# Patient Record
Sex: MALE | Race: WHITE | NOT HISPANIC OR LATINO | Employment: FULL TIME | URBAN - METROPOLITAN AREA
[De-identification: names, ages, dates, MRNs, and addresses within clinical notes are randomized per-mention and may not be internally consistent; named-entity substitution may affect disease eponyms.]

---

## 2022-08-23 ENCOUNTER — OFFICE VISIT (OUTPATIENT)
Dept: URGENT CARE | Facility: CLINIC | Age: 32
End: 2022-08-23
Payer: OTHER MISCELLANEOUS

## 2022-08-23 VITALS
HEART RATE: 87 BPM | HEIGHT: 73 IN | SYSTOLIC BLOOD PRESSURE: 144 MMHG | OXYGEN SATURATION: 97 % | TEMPERATURE: 98 F | RESPIRATION RATE: 18 BRPM | WEIGHT: 283 LBS | DIASTOLIC BLOOD PRESSURE: 89 MMHG | BODY MASS INDEX: 37.51 KG/M2

## 2022-08-23 DIAGNOSIS — M54.30 SCIATIC LEG PAIN: Primary | ICD-10-CM

## 2022-08-23 DIAGNOSIS — Z02.6 ENCOUNTER RELATED TO WORKER'S COMPENSATION CLAIM: ICD-10-CM

## 2022-08-23 PROCEDURE — 96372 THER/PROPH/DIAG INJ SC/IM: CPT | Mod: S$GLB,,, | Performed by: PHYSICIAN ASSISTANT

## 2022-08-23 PROCEDURE — 99203 OFFICE O/P NEW LOW 30 MIN: CPT | Mod: 25,S$GLB,, | Performed by: PHYSICIAN ASSISTANT

## 2022-08-23 PROCEDURE — 96372 PR INJECTION,THERAP/PROPH/DIAG2ST, IM OR SUBCUT: ICD-10-PCS | Mod: S$GLB,,, | Performed by: PHYSICIAN ASSISTANT

## 2022-08-23 PROCEDURE — 99203 PR OFFICE/OUTPT VISIT, NEW, LEVL III, 30-44 MIN: ICD-10-PCS | Mod: 25,S$GLB,, | Performed by: PHYSICIAN ASSISTANT

## 2022-08-23 RX ORDER — PREDNISONE 20 MG/1
40 TABLET ORAL DAILY
Qty: 10 TABLET | Refills: 0 | Status: SHIPPED | OUTPATIENT
Start: 2022-08-23 | End: 2022-08-28

## 2022-08-23 RX ORDER — BETAMETHASONE SODIUM PHOSPHATE AND BETAMETHASONE ACETATE 3; 3 MG/ML; MG/ML
9 INJECTION, SUSPENSION INTRA-ARTICULAR; INTRALESIONAL; INTRAMUSCULAR; SOFT TISSUE
Status: COMPLETED | OUTPATIENT
Start: 2022-08-23 | End: 2022-08-23

## 2022-08-23 RX ADMIN — BETAMETHASONE SODIUM PHOSPHATE AND BETAMETHASONE ACETATE 9 MG: 3; 3 INJECTION, SUSPENSION INTRA-ARTICULAR; INTRALESIONAL; INTRAMUSCULAR; SOFT TISSUE at 03:08

## 2022-08-23 NOTE — PROGRESS NOTES
"Subjective:       Patient ID: Car Rivera is a 31 y.o. male.    Vitals:  height is 6' 1" (1.854 m) and weight is 128.4 kg (283 lb). His temperature is 98.2 °F (36.8 °C). His blood pressure is 144/89 (abnormal) and his pulse is 87. His respiration is 18 and oxygen saturation is 97%.     Chief Complaint: Back Pain    This is a 31 y.o. male who presents today with a chief complaint of left low back pain that started on 7/24. Pt states he was at work lifting heavy boxes on 7/24 when his foot twisted injuring his back. He was seen in [place] at an  and states he was given muscle relaxers, naproxen, and steroids. He report no relief and states symptoms have persisted. Pt states the pain is made worse with movement. He states the pain radiates down the back of his right leg. Denies saddle paresthesia, extremity weakness, or bowel/urinary incontinence.     Back Pain  This is a new problem. The current episode started 1 to 4 weeks ago. The problem occurs constantly. The problem has been gradually worsening since onset. The pain is present in the lumbar spine and gluteal. The quality of the pain is described as aching, stabbing, shooting, cramping and burning. The pain radiates to the left thigh. The pain is at a severity of 6/10. The pain is moderate. The pain is worse during the night. The symptoms are aggravated by bending and position (walking ). Stiffness is present all day. Associated symptoms include tingling. Pertinent negatives include no abdominal pain, bladder incontinence, bowel incontinence, chest pain, dysuria, fever, headaches, numbness or pelvic pain. Risk factors include recent trauma and obesity. He has tried NSAIDs, muscle relaxant and home exercises for the symptoms. The treatment provided mild relief.       Constitution: Negative for chills, sweating and fever.   HENT: Negative for ear pain, congestion and sore throat.    Neck: Negative for neck pain, neck stiffness and painful lymph nodes. "   Cardiovascular: Negative for chest pain, palpitations and sob on exertion.   Eyes: Negative for eye discharge, eye itching and eye pain.   Respiratory: Negative for cough, sputum production and shortness of breath.    Gastrointestinal: Negative for abdominal pain, nausea, vomiting, diarrhea and bowel incontinence.   Genitourinary: Negative for dysuria, bladder incontinence, hematuria and pelvic pain.   Musculoskeletal: Positive for pain, back pain, pain with walking, muscle cramps and muscle ache.   Skin: Negative for pale, rash and wound.   Neurological: Positive for tingling. Negative for dizziness, light-headedness, coordination disturbances, headaches and numbness.   Hematologic/Lymphatic: Negative for swollen lymph nodes.       Objective:      Physical Exam   Constitutional: He is oriented to person, place, and time. He appears well-developed. He is cooperative. He does not appear ill. No distress.   HENT:   Head: Normocephalic and atraumatic.   Ears:   Right Ear: External ear normal.   Left Ear: External ear normal.   Nose: Nose normal.   Mouth/Throat: Oropharynx is clear and moist.   Eyes: Conjunctivae, EOM and lids are normal.   Neck: Trachea normal and phonation normal. Neck supple. No decreased range of motion present. No pain with movement present. No spinous process tenderness present. No muscular tenderness present.   Cardiovascular: Regular rhythm and normal heart sounds.   No murmur heard.Exam reveals no gallop.   Pulmonary/Chest: Effort normal and breath sounds normal. No respiratory distress. He has no decreased breath sounds. He has no wheezes. He has no rhonchi. He has no rales.   Musculoskeletal: Normal range of motion.         General: Normal range of motion.      Cervical back: He exhibits no tenderness, no bony tenderness, no swelling and no spasm.      Thoracic back: Normal. He exhibits normal range of motion, no tenderness, no bony tenderness, no swelling, no deformity and no spasm.       Lumbar back: Normal. He exhibits normal range of motion, no tenderness, no bony tenderness, no swelling, no edema, no deformity and no spasm.      Comments: Full ROM and 5/5 strength of BLE   Neurological: He is alert and oriented to person, place, and time. He has normal motor skills and normal sensation. Gait and coordination normal.   Skin: Skin is warm, dry, intact and not diaphoretic.   Psychiatric: His speech is normal and behavior is normal. Judgment and thought content normal.   Nursing note and vitals reviewed.        Assessment:       1. Sciatic leg pain    2. Encounter related to worker's compensation claim          Plan:         Will treat as sciatic nerve pain with steroids. Pt to follow up with occupational health. Contact information given to schedule appointment. No x-ray available at this location today. X-ray images may be obtained when patient follows up with occupational health.     Sciatic leg pain  -     betamethasone acetate-betamethasone sodium phosphate injection 9 mg  -     Ambulatory referral/consult to Occupational Medicine  -     predniSONE (DELTASONE) 20 MG tablet; Take 2 tablets (40 mg total) by mouth once daily. for 5 days  Dispense: 10 tablet; Refill: 0    Encounter related to worker's compensation claim  -     betamethasone acetate-betamethasone sodium phosphate injection 9 mg  -     Ambulatory referral/consult to Occupational Medicine  -     predniSONE (DELTASONE) 20 MG tablet; Take 2 tablets (40 mg total) by mouth once daily. for 5 days  Dispense: 10 tablet; Refill: 0      Patient Instructions   -Take oral steroid as prescribed.  -Call [email or web address] to schedule follow up appointment with occupational health, as discussed.    Please follow up with your primary care provider within 2-5 days if your signs and symptoms have not resolved or worsen.     If your condition worsens or fails to improve we recommend that you receive another evaluation at the emergency room immediately or  contact your primary medical clinic to discuss your concerns.   You must understand that you have received an Urgent Care treatment only and that you may be released before all of your medical problems are known or treated. You, the patient, will arrange for follow up care as instructed.

## 2022-08-23 NOTE — PATIENT INSTRUCTIONS
-Take oral steroid as prescribed.  -Call [email or web address] to schedule follow up appointment with occupational health, as discussed.    Please follow up with your primary care provider within 2-5 days if your signs and symptoms have not resolved or worsen.     If your condition worsens or fails to improve we recommend that you receive another evaluation at the emergency room immediately or contact your primary medical clinic to discuss your concerns.   You must understand that you have received an Urgent Care treatment only and that you may be released before all of your medical problems are known or treated. You, the patient, will arrange for follow up care as instructed.

## 2022-08-24 ENCOUNTER — OFFICE VISIT (OUTPATIENT)
Dept: URGENT CARE | Facility: CLINIC | Age: 32
End: 2022-08-24
Payer: OTHER MISCELLANEOUS

## 2022-08-24 VITALS
TEMPERATURE: 99 F | SYSTOLIC BLOOD PRESSURE: 142 MMHG | HEIGHT: 73 IN | RESPIRATION RATE: 20 BRPM | BODY MASS INDEX: 37.51 KG/M2 | WEIGHT: 283 LBS | HEART RATE: 101 BPM | DIASTOLIC BLOOD PRESSURE: 96 MMHG | OXYGEN SATURATION: 97 %
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
## 2022-08-24 DIAGNOSIS — Z02.6 ENCOUNTER RELATED TO WORKER'S COMPENSATION CLAIM: ICD-10-CM

## 2022-08-24 DIAGNOSIS — M54.42 ACUTE MIDLINE LOW BACK PAIN WITH LEFT-SIDED SCIATICA: Primary | ICD-10-CM

## 2022-08-24 DIAGNOSIS — M54.9 DORSALGIA, UNSPECIFIED: ICD-10-CM

## 2022-08-24 PROCEDURE — 72100 XR LUMBAR SPINE 2 OR 3 VIEWS: ICD-10-PCS | Mod: S$GLB,,, | Performed by: RADIOLOGY

## 2022-08-24 PROCEDURE — 72100 X-RAY EXAM L-S SPINE 2/3 VWS: CPT | Mod: S$GLB,,, | Performed by: RADIOLOGY

## 2022-08-24 PROCEDURE — 99203 PR OFFICE/OUTPT VISIT, NEW, LEVL III, 30-44 MIN: ICD-10-PCS | Mod: S$GLB,,, | Performed by: PHYSICIAN ASSISTANT

## 2022-08-24 PROCEDURE — 99203 OFFICE O/P NEW LOW 30 MIN: CPT | Mod: S$GLB,,, | Performed by: PHYSICIAN ASSISTANT

## 2022-08-24 RX ORDER — NAPROXEN 500 MG/1
500 TABLET ORAL 2 TIMES DAILY WITH MEALS
Qty: 30 TABLET | Refills: 0 | Status: SHIPPED | OUTPATIENT
Start: 2022-08-24 | End: 2022-10-24 | Stop reason: SDUPTHER

## 2022-08-24 RX ORDER — METHOCARBAMOL 500 MG/1
1000 TABLET, FILM COATED ORAL 3 TIMES DAILY PRN
Qty: 60 TABLET | Refills: 0 | Status: SHIPPED | OUTPATIENT
Start: 2022-08-24 | End: 2022-09-08

## 2022-08-24 NOTE — LETTER
[facility] - Occupational Health  [address] [address]  Phone: [phone]  Fax: [phone]  [facility]: [email or web address]    Pt Name: Car Rivera  Injury Date: 07/24/2022   Employee ID: [id number] Date of First Treatment: 08/24/2022   Company: [employer]      Appointment Time:  Arrived: 01:51 PM   Provider: Zurdo Gomez PA-C Time Out:  04:45 PM     Office Treatment:   1. Acute midline low back pain with left-sided sciatica    2. Encounter related to worker's compensation claim    3. Dorsalgia, unspecified    4. Work related injury      Medications Ordered This Encounter   Medications    methocarbamoL (ROBAXIN) 500 MG Tab    naproxen (NAPROSYN) 500 MG tablet      Patient Instructions: Daily home exercises/warm soaks, PT to be scheduled once authorized      Restrictions: Avoid frequent bending/lifting/twisting, Avoid climbing/kneeling/squatting, No lifting/pushing/pulling more than 10 lbs, Sit or stand as needed, No Prolonged standing/walking     Return Appointment:     9/7/2022 at 10:30 AM     SH

## 2022-08-24 NOTE — PROGRESS NOTES
Subjective:       Patient ID: Car Rivera is a 31 y.o. male.    Chief Complaint: Back Pain (LOW)     New Workers comp 7/24/2022. Patient went to see his own MD. He saw an MD that never Xrayed him. Pain 7/10. He states that he has pain that goes into his left calk and he can not clean his [place] [facility]    31-year-old male presents with low back pain radiating to the left lower extremity for 1 month.  Patient states he was at work when he was twisting to the left while holding a box and felt immediate pain to the low back.  Patient reports he had to sit down after a while.  Says he then noticed pain radiating to the left calf intermittently.  He reports pain is worse with prolonged walking.  Says he went to urgent care while in [place] where the injury occurred and was prescribed muscle relaxers and NSAIDs with some relief.  No x-rays were done at the time.  He was then seen at urgent care yesterday and prescribed prednisone.  He has not started prednisone yet.  Again no x-rays were performed.  He was diagnosed with sciatica.  He denies bowel or bladder incontinence or saddle anesthesia.  He reports previous low back injury when he pulled a muscle while in the .  He reports his previous low back injury resolved prior to current injury. [facility]    Back Pain  This is a new problem. The current episode started more than 1 month ago. The problem occurs intermittently. The problem has been gradually worsening since onset. The pain is present in the lumbar spine. The pain radiates to the left thigh. The pain is at a severity of 7/10. The pain is moderate. The pain is worse during the night. The symptoms are aggravated by standing and position. Stiffness is present all day. Associated symptoms include numbness. Pertinent negatives include no bladder incontinence, bowel incontinence, chest pain or fever. He has tried ice, analgesics and muscle relaxant for the symptoms. The treatment provided mild relief.        Constitution: Negative for chills, fatigue and fever.   HENT: Negative for facial trauma.    Neck: Negative for neck pain and neck stiffness.   Cardiovascular: Negative for chest trauma and chest pain.   Eyes: Negative for eye trauma and eye pain.   Respiratory: Negative for shortness of breath.    Gastrointestinal: Negative for bowel incontinence.   Genitourinary: Negative for bladder incontinence.   Musculoskeletal: Positive for pain, abnormal ROM of joint, back pain, pain with walking, muscle cramps and muscle ache. Negative for joint pain, joint swelling and history of spine disorder.   Skin: Negative for color change, wound, abrasion, laceration, erythema and bruising.   Neurological: Positive for numbness and tingling. Negative for coordination disturbances.        Objective:      Physical Exam  Vitals and nursing note reviewed.   Constitutional:       General: He is not in acute distress.     Appearance: Normal appearance. He is well-developed.   HENT:      Head: Normocephalic and atraumatic.      Right Ear: Hearing and external ear normal.      Left Ear: Hearing and external ear normal.      Nose: Nose normal. No nasal deformity.   Eyes:      General: Lids are normal.      Conjunctiva/sclera: Conjunctivae normal.      Right eye: Right conjunctiva is not injected.      Left eye: Left conjunctiva is not injected.   Neck:      Trachea: Trachea normal.   Cardiovascular:      Pulses: Normal pulses.           Dorsalis pedis pulses are 2+ on the right side and 2+ on the left side.        Posterior tibial pulses are 2+ on the right side and 2+ on the left side.   Pulmonary:      Effort: Pulmonary effort is normal. No respiratory distress.      Breath sounds: No stridor.   Musculoskeletal:      Cervical back: Normal and normal range of motion. No spinous process tenderness or muscular tenderness.      Thoracic back: Normal.      Lumbar back: Tenderness present. No deformity. Decreased range of motion. Positive  left straight leg raise test. Negative right straight leg raise test.   Skin:     General: Skin is warm and dry.      Findings: No abrasion, bruising or erythema.   Neurological:      Mental Status: He is alert.      GCS: GCS eye subscore is 4. GCS verbal subscore is 5. GCS motor subscore is 6.      Sensory: No sensory deficit.      Motor: Motor function is intact. No weakness (BLE strength 5/5).      Deep Tendon Reflexes: Reflexes are normal and symmetric.      Reflex Scores:       Patellar reflexes are 2+ on the right side and 2+ on the left side.       Achilles reflexes are 2+ on the right side and 2+ on the left side.  Psychiatric:         Attention and Perception: He is attentive.         Speech: Speech normal.         Behavior: Behavior normal.         Thought Content: Thought content normal.           X-Ray Lumbar Spine 2 Or 3 Views    Result Date: 8/24/2022  EXAMINATION: XR LUMBAR SPINE 2 OR 3 VIEWS CLINICAL HISTORY: Low back pain, no red flags, no prior management;WORKERS COMP;  Dorsalgia, unspecified TECHNIQUE: AP and lateral radiographs of the lumbar spine were obtained with a spot lateral radiograph of the lumbosacral junction. COMPARISON: None FINDINGS: There is straightening of the normal lumbar lordosis.  Otherwise, posterior vertebral alignment is satisfactory.  Vertebral body heights are well maintained.  There is no evidence for acute fracture or bone destruction.  There is disc space narrowing present at the L4-5 and L5-S1 levels.  There are small marginal osteophytes within the lower lumbar spine and lumbosacral junction.  No abnormal paraspinal masses are identified.  Sacroiliac joints are unremarkable.     No evidence for acute fracture, bone destruction, or subluxation. Degenerative changes within the lower lumbar spine and lumbosacral junction. Electronically signed by: Fabricio Ma MD Date:    08/24/2022 Time:    16:25    Assessment:       1. Acute midline low back pain with left-sided  sciatica    2. Encounter related to worker's compensation claim    3. Dorsalgia, unspecified    4. Work related injury        Plan:         Patient works shift work in [employer].  He works 2 weeks on and 1 week off.  There may be a conflict with work and being able to get physical therapy.  I instructed patient to discuss with his supervisor to see if there is a solution.  He may contact me with any questions.      Medications Ordered This Encounter   Medications    methocarbamoL (ROBAXIN) 500 MG Tab     Sig: Take 2 tablets (1,000 mg total) by mouth 3 (three) times daily as needed (muscle spasms).     Dispense:  60 tablet     Refill:  0    naproxen (NAPROSYN) 500 MG tablet     Sig: Take 1 tablet (500 mg total) by mouth 2 (two) times daily with meals.     Dispense:  30 tablet     Refill:  0     Patient Instructions: Daily home exercises/warm soaks, PT to be scheduled once authorized   Restrictions: Avoid frequent bending/lifting/twisting, Avoid climbing/kneeling/squatting, No lifting/pushing/pulling more than 10 lbs, Sit or stand as needed, No Prolonged standing/walking  Follow up in about 2 weeks (around 9/7/2022).

## 2022-08-25 ENCOUNTER — TELEPHONE (OUTPATIENT)
Dept: URGENT CARE | Facility: CLINIC | Age: 32
End: 2022-08-25
Payer: OTHER MISCELLANEOUS

## 2022-08-25 DIAGNOSIS — M54.9 DORSALGIA, UNSPECIFIED: Primary | ICD-10-CM

## 2022-08-25 RX ORDER — PREDNISONE 20 MG/1
40 TABLET ORAL DAILY
Qty: 10 TABLET | Refills: 0 | Status: SHIPPED | OUTPATIENT
Start: 2022-08-25 | End: 2022-08-30

## 2022-09-12 ENCOUNTER — OFFICE VISIT (OUTPATIENT)
Dept: URGENT CARE | Facility: CLINIC | Age: 32
End: 2022-09-12
Payer: OTHER MISCELLANEOUS

## 2022-09-12 VITALS
BODY MASS INDEX: 37.37 KG/M2 | HEIGHT: 73 IN | SYSTOLIC BLOOD PRESSURE: 139 MMHG | OXYGEN SATURATION: 98 % | DIASTOLIC BLOOD PRESSURE: 92 MMHG | TEMPERATURE: 98 F | HEART RATE: 89 BPM | WEIGHT: 282 LBS | RESPIRATION RATE: 20 BRPM

## 2022-09-12 DIAGNOSIS — M54.42 ACUTE MIDLINE LOW BACK PAIN WITH LEFT-SIDED SCIATICA: Primary | ICD-10-CM

## 2022-09-12 DIAGNOSIS — M54.16 LUMBAR RADICULOPATHY: ICD-10-CM

## 2022-09-12 PROCEDURE — 99214 OFFICE O/P EST MOD 30 MIN: CPT | Mod: S$GLB,,, | Performed by: EMERGENCY MEDICINE

## 2022-09-12 PROCEDURE — 99214 PR OFFICE/OUTPT VISIT, EST, LEVL IV, 30-39 MIN: ICD-10-PCS | Mod: S$GLB,,, | Performed by: EMERGENCY MEDICINE

## 2022-09-12 RX ORDER — TIZANIDINE 4 MG/1
4 TABLET ORAL EVERY 8 HOURS
Qty: 30 TABLET | Refills: 0 | Status: SHIPPED | OUTPATIENT
Start: 2022-09-12 | End: 2022-09-22

## 2022-09-12 NOTE — LETTER
[facility] - Occupational Health  [address] [address]  Phone: [phone]  Fax: [phone]  [facility]: [email or web address]    Pt Name: Car Rivera  Injury Date: 07/24/2022   Employee ID: [id number] Date of Treatment: 09/12/2022   Company: [id number]      Appointment Time: 8:30am Arrived: 8:36am   Provider: Robles Castillo MD Time Out: 9:45am     Office Treatment:   1. Acute midline low back pain with left-sided sciatica    2. Lumbar radiculopathy      Medications Ordered This Encounter   Medications    tiZANidine (ZANAFLEX) 4 MG tablet      Patient Instructions: Attention not to aggravate affected area, Continue Physical Therapy, MRI to be scheduled once authorized, Apply ice 24-48 hours then apply heat/warm soaks, Daily home exercises/warm soaks      Restrictions: Sit or stand as needed, Avoid frequent bending/lifting/twisting, No lifting/pushing/pulling more than 10 lbs, Avoid climbing/kneeling/squatting, No Prolonged standing/walking     Return Appointment: 10/3/2022 at 9:00am.

## 2022-09-12 NOTE — PROGRESS NOTES
Subjective:       Patient ID: Car Rivera is a 32 y.o. male.    Chief Complaint: Back Pain (low)    [employer] Workers comp 7/24/2022. Patient is here to follow up on a low back injury. Patient states that he is feeling the same. Patient is going to PT. Patient is out of Naproxen. Pain 5/10. [facility]    Patient works 2 weeks on and 2 weeks off at [employer] and has been working light duty.  Reports persistent pain to the left low back with spasm as well as a shooting pain down his left lower extremity and calf as well as as occasional paresthesias to the left lower extremity.  No bowel or bladder incontinence.  He states the worst thing about it is the spasm of the back.  He also reports no significant improvement with physical therapy and voices frustration with physical therapy in [place].  He has been taking naproxen and Robaxin and also reports no significant improvement with.  Will change from Robaxin to Zanaflex.  Will also order MRI for persistent pain without relief with conservative measures of rest, physical therapy, anti-inflammatories already tried to rule out herniated disc or other acute abnormality with nerve root compression.  He will return when he comes back from work after October 2nd.    Back Pain  This is a new problem. The current episode started more than 1 month ago. The problem occurs intermittently. The problem has been gradually worsening since onset. The pain is present in the lumbar spine. The pain radiates to the left thigh. The pain is at a severity of 7/10. The pain is moderate. The pain is Worse during the night. The symptoms are aggravated by standing and position. Stiffness is present All day. Associated symptoms include numbness. Pertinent negatives include no bladder incontinence, bowel incontinence, chest pain or fever. He has tried ice, analgesics and muscle relaxant for the symptoms. The treatment provided mild relief.     ROS    Constitution: Negative for chills, fatigue  and fever.   HENT:  Negative for facial trauma.    Neck: Negative for neck pain and neck stiffness.   Cardiovascular:  Negative for chest trauma and chest pain.   Eyes:  Negative for eye trauma and eye pain.   Respiratory:  Negative for shortness of breath.    Gastrointestinal:  Negative for bowel incontinence.   Genitourinary:  Negative for bladder incontinence.   Musculoskeletal:  Positive for pain, abnormal ROM of joint, back pain, pain with walking, muscle cramps and muscle ache. Negative for joint pain, joint swelling and history of spine disorder.   Skin:  Negative for color change, wound, abrasion, laceration, erythema and bruising.   Neurological:  Positive for numbness and tingling. Negative for coordination disturbances.      Objective:      Physical Exam  Vitals and nursing note reviewed.   Constitutional:       General: He is not in acute distress.     Appearance: Normal appearance. He is well-developed.   HENT:      Head: Normocephalic and atraumatic.      Right Ear: Hearing and external ear normal.      Left Ear: Hearing and external ear normal.      Nose: Nose normal. No nasal deformity.   Eyes:      General: Lids are normal.      Conjunctiva/sclera: Conjunctivae normal.      Right eye: Right conjunctiva is not injected.      Left eye: Left conjunctiva is not injected.   Neck:      Trachea: Trachea normal.   Cardiovascular:      Pulses: Normal pulses.           Dorsalis pedis pulses are 2+ on the right side and 2+ on the left side.        Posterior tibial pulses are 2+ on the right side and 2+ on the left side.   Pulmonary:      Effort: Pulmonary effort is normal. No respiratory distress.      Breath sounds: No stridor.   Musculoskeletal:         General: Tenderness and signs of injury present. No swelling.      Cervical back: Normal and normal range of motion. No spinous process tenderness or muscular tenderness.      Thoracic back: Normal.      Lumbar back: Tenderness present. No deformity.  Decreased range of motion. Positive left straight leg raise test. Negative right straight leg raise test.   Skin:     General: Skin is warm and dry.      Findings: No abrasion, bruising or erythema.   Neurological:      Mental Status: He is alert.      GCS: GCS eye subscore is 4. GCS verbal subscore is 5. GCS motor subscore is 6.      Sensory: No sensory deficit.      Motor: Motor function is intact. No weakness (BLE strength 5/5).      Deep Tendon Reflexes: Reflexes are normal and symmetric.      Reflex Scores:       Patellar reflexes are 2+ on the right side and 2+ on the left side.       Achilles reflexes are 2+ on the right side and 2+ on the left side.  Psychiatric:         Attention and Perception: He is attentive.         Speech: Speech normal.         Behavior: Behavior normal.         Thought Content: Thought content normal.       Assessment:       1. Acute midline low back pain with left-sided sciatica    2. Lumbar radiculopathy          Plan:       Patient works 2 weeks on and 2 weeks off at [employer] and has been working light duty.  Reports persistent pain to the left low back with spasm as well as a shooting pain down his left lower extremity and calf as well as as occasional paresthesias to the left lower extremity.  No bowel or bladder incontinence.  He states the worst thing about it is the spasm of the back.  He also reports no significant improvement with physical therapy and voices frustration with physical therapy in [place].  He has been taking naproxen and Robaxin and also reports no significant improvement with.  Will change from Robaxin to Zanaflex.  Will also order MRI for persistent pain without relief with conservative measures of rest, physical therapy, anti-inflammatories already tried to rule out herniated disc or other acute abnormality with nerve root compression.  He will return when he comes back from work after October 2nd.  Medications Ordered This Encounter   Medications     tiZANidine (ZANAFLEX) 4 MG tablet     Sig: Take 1 tablet (4 mg total) by mouth every 8 (eight) hours. for 10 days     Dispense:  30 tablet     Refill:  0     Patient Instructions: Attention not to aggravate affected area, Continue Physical Therapy, MRI to be scheduled once authorized, Apply ice 24-48 hours then apply heat/warm soaks, Daily home exercises/warm soaks   Restrictions: Sit or stand as needed, Avoid frequent bending/lifting/twisting, No lifting/pushing/pulling more than 10 lbs, Avoid climbing/kneeling/squatting, No Prolonged standing/walking  Follow up in about 3 weeks (around 10/3/2022).

## 2022-09-30 ENCOUNTER — HOSPITAL ENCOUNTER (OUTPATIENT)
Dept: RADIOLOGY | Facility: OTHER | Age: 32
Discharge: HOME | End: 2022-09-30
Attending: EMERGENCY MEDICINE
Payer: OTHER MISCELLANEOUS

## 2022-09-30 DIAGNOSIS — M54.16 LUMBAR RADICULOPATHY: ICD-10-CM

## 2022-09-30 PROCEDURE — 72148 MRI LUMBAR SPINE W/O DYE: CPT | Mod: TC

## 2022-09-30 PROCEDURE — 72148 MRI LUMBAR SPINE WITHOUT CONTRAST: ICD-10-PCS | Mod: 26,,, | Performed by: RADIOLOGY

## 2022-09-30 PROCEDURE — 72148 MRI LUMBAR SPINE W/O DYE: CPT | Mod: 26,,, | Performed by: RADIOLOGY

## 2022-10-03 ENCOUNTER — OFFICE VISIT (OUTPATIENT)
Dept: URGENT CARE | Facility: CLINIC | Age: 32
End: 2022-10-03
Payer: OTHER MISCELLANEOUS

## 2022-10-03 VITALS
DIASTOLIC BLOOD PRESSURE: 93 MMHG | HEART RATE: 84 BPM | BODY MASS INDEX: 37.11 KG/M2 | SYSTOLIC BLOOD PRESSURE: 153 MMHG | RESPIRATION RATE: 18 BRPM | TEMPERATURE: 97 F | HEIGHT: 73 IN | WEIGHT: 280 LBS | OXYGEN SATURATION: 100 %

## 2022-10-03 DIAGNOSIS — M54.42 ACUTE MIDLINE LOW BACK PAIN WITH LEFT-SIDED SCIATICA: Primary | ICD-10-CM

## 2022-10-03 DIAGNOSIS — M51.26 PROTRUSION OF LUMBAR INTERVERTEBRAL DISC: ICD-10-CM

## 2022-10-03 DIAGNOSIS — M54.16 LUMBAR RADICULOPATHY: ICD-10-CM

## 2022-10-03 PROCEDURE — 99214 OFFICE O/P EST MOD 30 MIN: CPT | Mod: S$GLB,,, | Performed by: NURSE PRACTITIONER

## 2022-10-03 PROCEDURE — 99214 PR OFFICE/OUTPT VISIT, EST, LEVL IV, 30-39 MIN: ICD-10-PCS | Mod: S$GLB,,, | Performed by: NURSE PRACTITIONER

## 2022-10-03 RX ORDER — ETODOLAC 400 MG/1
400 TABLET, FILM COATED ORAL 2 TIMES DAILY
Qty: 30 TABLET | Refills: 0 | Status: SHIPPED | OUTPATIENT
Start: 2022-10-03 | End: 2022-10-24 | Stop reason: DRUGHIGH

## 2022-10-03 NOTE — LETTER
[facility] - [facility]  [address] [address]  Phone: [phone]  Fax: [phone]  [facility]: [email or web address]     Name: Car Rivera  Injury Date: 07/24/2022   Employee ID: [id number] Date of Treatment: 10/03/2022   Company: [employer]      Appointment Time: 09:00 AM Arrived: 8:55 AM    Provider: Ludy Ruiz NP Time Out: 10:10 AM      Office Treatment:   1. Acute midline low back pain with left-sided sciatica    2. Protrusion of lumbar intervertebral disc    3. Lumbar radiculopathy      Medications Ordered This Encounter   Medications    etodolac (LODINE) 400 MG tablet      Patient Instructions: Attention not to aggravate affected area, Daily home exercises/warm soaks, Continue Physical Therapy, Referral to specialist to be scheduled, once authorized      Restrictions: Avoid frequent bending/lifting/twisting, Avoid climbing/kneeling/squatting, No lifting/pushing/pulling more than 10 lbs, Sit or stand as needed, No Prolonged standing/walking, Discharged to Ortho/Neuro/Opthomologist/Surgeon     Return Appointment: 10/24/2022 at 9:00 AM NI

## 2022-10-03 NOTE — PROGRESS NOTES
Subjective:       Patient ID: Car Rivera is a 32 y.o. male.    Chief Complaint: Back Pain (lower)    [employer] comp 7/24/2022. Patient is here to follow up on a low back injury. Patient states that he is feeling the same. Patient is going to PT. Patient is out of Naproxen. Pain 5/10. He states he need a mri     He had MRI on Friday 9/30/22.  He is home this week. Returns to work in [place] on the 8th. 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 Says PT has not been helpful. Pain to L lower back with radiating, shooting pain to thigh and foot. Naprosyn and Robaxin have not helped. MWT    Back Pain  This is a new problem. The current episode started more than 1 month ago. The problem occurs intermittently. The problem has been gradually worsening since onset. The pain is present in the lumbar spine. The pain radiates to the left thigh. The pain is at a severity of 7/10. The pain is moderate. The pain is Worse during the night. The symptoms are aggravated by standing and position. Stiffness is present All day. Associated symptoms include numbness. Pertinent negatives include no abdominal pain, bladder incontinence, bowel incontinence, chest pain or fever. He has tried ice, analgesics and muscle relaxant for the symptoms. The treatment provided mild relief.     Constitution: Negative for chills, fatigue and fever.   HENT:  Negative for facial trauma.    Neck: Negative for neck pain and neck stiffness.   Cardiovascular:  Negative for chest trauma and chest pain.   Eyes:  Negative for eye trauma and eye pain.   Respiratory:  Negative for shortness of breath.    Gastrointestinal:  Negative for abdominal pain, nausea, vomiting and bowel incontinence.   Genitourinary:  Negative for bladder incontinence.   Musculoskeletal:  Positive for pain, abnormal ROM of joint, back pain, pain with walking, muscle cramps and muscle ache. Negative for joint pain, joint swelling and history of spine disorder.   Skin:  Negative for color change, wound, abrasion,  laceration, erythema and bruising.   Neurological:  Positive for numbness and tingling. Negative for coordination disturbances.      Objective:      Physical Exam  Constitutional:       Appearance: Normal appearance.   Cardiovascular:      Pulses: Normal pulses.   Pulmonary:      Effort: Pulmonary effort is normal.   Musculoskeletal:         General: Tenderness present. No swelling.      Lumbar back: Tenderness present. No swelling or deformity. Decreased range of motion. Positive left straight leg raise test. Negative right straight leg raise test.        Back:       Comments: Pain to L lower back with radiation to L thigh and to foot. Positive L SLR. Pain with flexion 60 degrees. Pain also with extension and bilateral bending.    Skin:     General: Skin is warm and dry.      Findings: No bruising or erythema.   Neurological:      General: No focal deficit present.      Mental Status: He is alert and oriented to person, place, and time.      Deep Tendon Reflexes:      Reflex Scores:       Patellar reflexes are 2+ on the right side and 2+ on the left side.       Achilles reflexes are 2+ on the right side and 2+ on the left side.  Psychiatric:         Mood and Affect: Mood normal.         Behavior: Behavior normal.         Thought Content: Thought content normal.         Judgment: Judgment normal.       Assessment:       1. Acute midline low back pain with left-sided sciatica    2. Protrusion of lumbar intervertebral disc    3. Lumbar radiculopathy        Plan:     MRI Lumbar Spine Without Contrast    Result Date: 9/30/2022  EXAMINATION: MRI LUMBAR SPINE WITHOUT CONTRAST CLINICAL HISTORY: Lumbar radiculopathy, trauma; Radiculopathy, lumbar region TECHNIQUE: Multiplanar, multisequence MR images were acquired from the thoracolumbar junction to the sacrum without the administration of contrast. COMPARISON: None FINDINGS: The marrow demonstrates homogeneous signal.  Vertebral body heights are maintained.  Disc space  narrowing and disc desiccation L4-5 and L5-S1. slight grade 1 retrolisthesis L4-5 and L5-S1.  Conus terminates appropriately at L1. Multilevel degenerative change as diesel below: L1-2: Mild facet arthropathy without significant canal or neural foraminal narrowing. L2-3: Mild facet arthropathy without significant canal or neural foraminal narrowing. L3-4: Facet arthropathy. Posterior epidural lipomatosis with mild canal narrowing. L4-5: Central disc protrusion measures approximately 7 mm in AP dimension.  Mild facet arthropathy.  Combination of degenerative change and prominent posterior epidural fat result in moderate-severe canal narrowing and mild left neural foraminal narrowing. L5-S1: Posterior circumferential disc bulge with superimposed left paracentral disc protrusion measuring 13 mm in AP dimension.  Disc contacts and results in mass effect upon the descending left S1 nerve root.  Prominent anterior epidural lipomatosis resulting in diffuse narrowing of the thecal sac.  Moderate left neural foraminal narrowing.     At L4-5, central disc protrusion.  Moderate canal and mild left neural foraminal narrowing. At L5-S1, large left paracentral disc protrusion contacting and resulting in posterior displacement of the descending left S1 nerve root.  Diffuse narrowing of the thecal sac related to degenerative change and prominent anterior epidural lipomatosis.  Moderate left neural foraminal narrowing. Electronically signed by: Anju Alfaor Date:    09/30/2022 Time:    14:12    I have reviewed the MRI of the L spine with patient which shows L5S1 large L paracentral disc protrusion contacting and resulting in posterior displacement of the descending L S1 nerve root.   A referral has been made to neurosurgery for follow up. He will return to work in [place] on 10/8 and will return home on 10/24. If he gets scheduled with specialist he does not have to keep the appt at this clinic on the 24th.    Medications Ordered This  Encounter   Medications    etodolac (LODINE) 400 MG tablet     Sig: Take 1 tablet (400 mg total) by mouth 2 (two) times daily. Take with meals.     Dispense:  30 tablet     Refill:  0   May take OTC Tylenol (Acetominophen)  in addition to the prescribed anti-inflammatory medicine (NSAID).  Do not take other NSAIDS (such as Ibuprofen,Advil, Aleve, Motrin) in addition to the prescribed NSAID.   Patient Instructions: Attention not to aggravate affected area, Daily home exercises/warm soaks, Continue Physical Therapy, Referral to specialist to be scheduled, once authorized   Restrictions: Avoid frequent bending/lifting/twisting, Avoid climbing/kneeling/squatting, No lifting/pushing/pulling more than 10 lbs, Sit or stand as needed, No Prolonged standing/walking, Discharged to Ortho/Neuro/Opthomologist/Surgeon  Follow up in about 3 weeks (around 10/24/2022).

## 2022-10-24 ENCOUNTER — OFFICE VISIT (OUTPATIENT)
Dept: NEUROSURGERY | Facility: CLINIC | Age: 32
End: 2022-10-24
Payer: OTHER MISCELLANEOUS

## 2022-10-24 VITALS
HEART RATE: 116 BPM | HEIGHT: 73 IN | BODY MASS INDEX: 37.11 KG/M2 | DIASTOLIC BLOOD PRESSURE: 89 MMHG | SYSTOLIC BLOOD PRESSURE: 135 MMHG | TEMPERATURE: 98 F | WEIGHT: 280 LBS

## 2022-10-24 DIAGNOSIS — M54.16 LUMBAR RADICULOPATHY: Primary | ICD-10-CM

## 2022-10-24 DIAGNOSIS — M54.42 ACUTE MIDLINE LOW BACK PAIN WITH LEFT-SIDED SCIATICA: ICD-10-CM

## 2022-10-24 PROCEDURE — 99204 PR OFFICE/OUTPT VISIT, NEW, LEVL IV, 45-59 MIN: ICD-10-PCS | Mod: S$GLB,,, | Performed by: NURSE PRACTITIONER

## 2022-10-24 PROCEDURE — 99999 PR PBB SHADOW E&M-EST. PATIENT-LVL IV: CPT | Mod: PBBFAC,,, | Performed by: NURSE PRACTITIONER

## 2022-10-24 PROCEDURE — 99204 OFFICE O/P NEW MOD 45 MIN: CPT | Mod: S$GLB,,, | Performed by: NURSE PRACTITIONER

## 2022-10-24 PROCEDURE — 99999 PR PBB SHADOW E&M-EST. PATIENT-LVL IV: ICD-10-PCS | Mod: PBBFAC,,, | Performed by: NURSE PRACTITIONER

## 2022-10-24 RX ORDER — LIDOCAINE 50 MG/G
1 PATCH TOPICAL DAILY PRN
Qty: 30 PATCH | Refills: 0 | Status: SHIPPED | OUTPATIENT
Start: 2022-10-24 | End: 2022-11-14 | Stop reason: SDUPTHER

## 2022-10-24 RX ORDER — NAPROXEN 500 MG/1
500 TABLET ORAL 2 TIMES DAILY WITH MEALS
Qty: 30 TABLET | Refills: 0 | Status: SHIPPED | OUTPATIENT
Start: 2022-10-24 | End: 2022-11-14 | Stop reason: SDUPTHER

## 2022-10-24 RX ORDER — TIZANIDINE 4 MG/1
4 TABLET ORAL EVERY 6 HOURS PRN
Qty: 30 TABLET | Refills: 0 | Status: SHIPPED | OUTPATIENT
Start: 2022-10-24 | End: 2022-11-14 | Stop reason: SDUPTHER

## 2022-10-24 NOTE — PROGRESS NOTES
Neurosurgery  History & Physical    SUBJECTIVE:     Chief Complaint: Lumbar Radiculopathy    History of Present Illness: Car Rivera is a 32 y.o. male being seen in clinic today to discuss concerns with lumbar radiculopathy. 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 He has also been taking naproxen and Zanaflex with mild relief. States that his symptoms have improved slightly since the initial injury. Rates his pain today as a 7/10. Describes the pain as constant and aching on the left-side of the low back with radiation down the posterior and lateral aspect of the left leg associated with numbness and tingling. Aggravating factors include standing and walking. Alleviating factors include frequent position changes and medications. Denies weakness, b/b dysfunction, saddle anesthesia, or gait instability. He is scheduled to return to work next week.       Review of patient's allergies indicates:  No Known Allergies    Current Outpatient Medications   Medication Sig Dispense Refill    LIDOcaine (LIDODERM) 5 % Place 1 patch onto the skin daily as needed (pain). Remove & Discard patch within 12 hours or as directed by MD 30 patch 0    naproxen (NAPROSYN) 500 MG tablet Take 1 tablet (500 mg total) by mouth 2 (two) times daily with meals. 30 tablet 0    tiZANidine (ZANAFLEX) 4 MG tablet Take 1 tablet (4 mg total) by mouth every 6 (six) hours as needed (muscle spasms). 30 tablet 0     No current facility-administered medications for this visit.       History reviewed. No pertinent past medical history.  History reviewed. No pertinent surgical history.  Family History    None       Social History     Socioeconomic History    Marital status:    Tobacco Use    Smoking status: Never    Smokeless tobacco: Never       Review of Systems   Constitutional:  Negative  "for activity change, appetite change and fever.   HENT:  Negative for hearing loss and postnasal drip.    Eyes:  Negative for pain and visual disturbance.   Respiratory:  Negative for shortness of breath.    Cardiovascular:  Negative for chest pain and palpitations.   Gastrointestinal:  Negative for abdominal pain.   Endocrine: Negative for polydipsia, polyphagia and polyuria.   Musculoskeletal:  Positive for back pain and myalgias. Negative for gait problem.   Neurological:  Positive for numbness. Negative for dizziness, weakness and headaches.   Psychiatric/Behavioral:  Negative for confusion and dysphoric mood.      OBJECTIVE:     Vital Signs  Temp: 97.7 °F (36.5 °C)  Pulse: (!) 116  BP: 135/89  Pain Score:   7  Height: 6' 1" (185.4 cm)  Weight: 127 kg (279 lb 15.8 oz)  Body mass index is 36.94 kg/m².      Neurosurgery Physical Exam  General: well developed, well nourished, no distress.   Head: normocephalic, atraumatic  Neurologic: Alert and oriented. Thought content appropriate.  GCS: Motor: 6/Verbal: 5/Eyes: 4 GCS Total: 15  Mental Status: Awake, Alert, Oriented x 4  Language: No aphasia  Speech: No dysarthria  Cranial nerves: face symmetric, tongue midline, CN II-XII grossly intact.   Eyes: pupils equal, round, reactive to light with accomodation, EOMI.   Pulmonary: normal respirations, no signs of respiratory distress  Abdomen: soft, non-distended  Skin: Skin is warm, dry and intact.  Sensory: intact to light touch throughout  Motor Strength:Moves all extremities spontaneously with good tone.    Strength  Deltoids Triceps Biceps Wrist Extension Wrist Flexion Hand    Upper: R 5/5 5/5 5/5 5/5 5/5 5/5    L 5/5 5/5 5/5 5/5 5/5 5/5     HF KE KF DF PF EHL   Lower: R 5/5 5/5 5/5 5/5 5/5 5/5    L 5/5 5/5 5/5 5/5 5/5 5/5        Cerebellar:   Gait stable, antalgic     Cervical:   ROM: Full with flexion, extension, lateral rotation and ear-to-shoulder bend.   Midline TTP: Negative.     Thoracic:  Midline TTP: " Negative.     Lumbar:  Midline TTP: Positive  Straight Leg Test: Positive LEFT    Other:  SI joint TTP: Negative.  Greater trochanter TTP: Negative.  Tenderness with external/internal hip rotation: Negative.    Diagnostic Results:  I have personally reviewed the MRI lumbar spine dated 9/30/22. The imaging shows L4-5, central disc protrusion with moderate canal and mild left neural foraminal narrowing. At L5-S1, large left paracentral disc protrusion contacting and resulting in posterior displacement of the descending left S1 nerve root.     ASSESSMENT/PLAN:   Car Rivera is a 32 y.o. male seen in clinic today to discuss concerns with lumbar radiculopathy. 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 I would like the patient to obtain PT with [facility] to see if additional pain relief can be obtained. Injections with pain management have also been recommended. The patient is apprehensive about any surgical interventions at this time.     I would like the patient to follow-up in 3 months to discuss progress with conservative therapy. I have encouraged him to contact the clinic with any questions, concerns, or adverse clinical changes. He verbalized understanding.      TEMITOPE Crum  Neurosurgery  [facility]. [place].      Note dictated with voice recognition software, please excuse any grammatical errors.

## 2022-10-25 ENCOUNTER — TELEPHONE (OUTPATIENT)
Dept: PAIN MEDICINE | Facility: OTHER | Age: 32
End: 2022-10-25
Payer: OTHER MISCELLANEOUS

## 2022-10-25 ENCOUNTER — PATIENT MESSAGE (OUTPATIENT)
Dept: PAIN MEDICINE | Facility: OTHER | Age: 32
End: 2022-10-25
Payer: OTHER MISCELLANEOUS

## 2022-10-25 DIAGNOSIS — M54.16 LUMBAR RADICULOPATHY: Primary | ICD-10-CM

## 2022-10-25 NOTE — TELEPHONE ENCOUNTER
Spoke to patient to schedule direct referral procedure. Patient is scheduled on 11/14/22 at 10:00 AM with Dr. Silva. Patient was offered an opportunity to be scheduled in the Pain Management Clinic for a consult with the performing provider before scheduling. Patient declined provider consult. Date, time, and instructions for procedure given to patient. Patient verbalized understanding.

## 2022-11-03 ENCOUNTER — TELEPHONE (OUTPATIENT)
Dept: NEUROSURGERY | Facility: CLINIC | Age: 32
End: 2022-11-03
Payer: OTHER MISCELLANEOUS

## 2022-11-03 NOTE — TELEPHONE ENCOUNTER
Spoke with pt, I notified him that Eleonora reviewed his message and stated that he can get the xray either before or after, it doesn't matter, pt verbalized understanding and stated thank you.

## 2022-11-03 NOTE — TELEPHONE ENCOUNTER
----- Message from Natalia Varner sent at 11/3/2022  3:06 PM CDT -----  Regarding: Appointment  Contact: [phone]  Pt is wanting to know if she do his XR before his procedure on 11/14 to have a needle place or should he go after his procedure when its originally scheduled for. Please call to discuss further @ [phone]

## 2022-11-14 ENCOUNTER — HOSPITAL ENCOUNTER (OUTPATIENT)
Facility: OTHER | Age: 32
Discharge: HOME | End: 2022-11-14
Attending: ANESTHESIOLOGY | Admitting: ANESTHESIOLOGY
Payer: OTHER MISCELLANEOUS

## 2022-11-14 VITALS
DIASTOLIC BLOOD PRESSURE: 84 MMHG | BODY MASS INDEX: 36.45 KG/M2 | TEMPERATURE: 98 F | SYSTOLIC BLOOD PRESSURE: 136 MMHG | HEIGHT: 73 IN | HEART RATE: 103 BPM | OXYGEN SATURATION: 95 % | RESPIRATION RATE: 16 BRPM | WEIGHT: 275 LBS

## 2022-11-14 DIAGNOSIS — G89.29 CHRONIC PAIN: ICD-10-CM

## 2022-11-14 DIAGNOSIS — M51.37 DDD (DEGENERATIVE DISC DISEASE), LUMBOSACRAL: ICD-10-CM

## 2022-11-14 DIAGNOSIS — M54.16 LUMBAR RADICULOPATHY: Primary | ICD-10-CM

## 2022-11-14 PROCEDURE — 62323 NJX INTERLAMINAR LMBR/SAC: CPT | Performed by: ANESTHESIOLOGY

## 2022-11-14 PROCEDURE — 62323 NJX INTERLAMINAR LMBR/SAC: CPT | Mod: ,,, | Performed by: ANESTHESIOLOGY

## 2022-11-14 PROCEDURE — 25000003 PHARM REV CODE 250: Performed by: ANESTHESIOLOGY

## 2022-11-14 PROCEDURE — 25500020 PHARM REV CODE 255: Performed by: ANESTHESIOLOGY

## 2022-11-14 PROCEDURE — 63600175 PHARM REV CODE 636 W HCPCS: Performed by: ANESTHESIOLOGY

## 2022-11-14 PROCEDURE — 62323 PR INJ LUMBAR/SACRAL, W/IMAGING GUIDANCE: ICD-10-PCS | Mod: ,,, | Performed by: ANESTHESIOLOGY

## 2022-11-14 RX ORDER — LIDOCAINE HYDROCHLORIDE 10 MG/ML
INJECTION, SOLUTION EPIDURAL; INFILTRATION; INTRACAUDAL; PERINEURAL
Status: DISCONTINUED | OUTPATIENT
Start: 2022-11-14 | End: 2022-11-14 | Stop reason: HOSPADM

## 2022-11-14 RX ORDER — LIDOCAINE HYDROCHLORIDE 20 MG/ML
INJECTION, SOLUTION INFILTRATION; PERINEURAL
Status: DISCONTINUED | OUTPATIENT
Start: 2022-11-14 | End: 2022-11-14 | Stop reason: HOSPADM

## 2022-11-14 RX ORDER — DEXAMETHASONE SODIUM PHOSPHATE 10 MG/ML
INJECTION INTRAMUSCULAR; INTRAVENOUS
Status: DISCONTINUED | OUTPATIENT
Start: 2022-11-14 | End: 2022-11-14 | Stop reason: HOSPADM

## 2022-11-14 RX ORDER — SODIUM CHLORIDE 9 MG/ML
500 INJECTION, SOLUTION INTRAVENOUS CONTINUOUS
Status: DISCONTINUED | OUTPATIENT
Start: 2022-11-14 | End: 2022-11-14 | Stop reason: HOSPADM

## 2022-11-14 RX ORDER — ALPRAZOLAM 0.5 MG/1
1 TABLET ORAL ONCE
Status: COMPLETED | OUTPATIENT
Start: 2022-11-14 | End: 2022-11-14

## 2022-11-14 RX ADMIN — ALPRAZOLAM 1 MG: 0.5 TABLET ORAL at 10:11

## 2022-11-14 NOTE — OP NOTE
Lumbar Interlaminar Epidural Steroid Injection under Fluoroscopic Guidance    The procedure, risks, benefits, and options were discussed with the patient. There are no contraindications to the procedure. The patent expressed understanding and agreed to the procedure. Informed written consent was obtained prior to the start of the procedure and can be found in the patient's chart.    PATIENT NAME: Car Rivera   MRN: [id number]     DATE OF PROCEDURE: 11/14/2022    PROCEDURE: Lumbar Interlaminar Epidural Steroid Injection L5/S1 under Fluoroscopic Guidance    PRE-OP DIAGNOSIS: Lumbar radiculopathy [M54.16] Lumbar radiculopathy [M54.16]    POST-OP DIAGNOSIS: Same    PHYSICIAN: Dustin Silva MD    ASSISTANTS: None    MEDICATIONS INJECTED: Preservative-free Decadron 10mg with 4cc of Lidocaine 1% MPF and preservative free normal saline    LOCAL ANESTHETIC INJECTED: Xylocaine 2%     SEDATION: None    ESTIMATED BLOOD LOSS: None    COMPLICATIONS: None    TECHNIQUE: Time-out was performed to identify the patient and procedure to be performed. With the patient laying in a prone position, the surgical area was prepped and draped in the usual sterile fashion using ChloraPrep and a fenestrated drape. The level was determined under fluoroscopy guidance. Skin anesthesia was achieved by injecting Lidocaine 2% over the injection site. The interlaminar space was then approached with a 20 gauge,  3.5 inch Tuohy needle that was introduced under fluoroscopic guidance in the AP, lateral and/or contralateral oblique imaging. Once the Ligamentum flavum was encountered loss of resistance to saline was used to enter the epidural space. With positive loss of resistance and negative aspiration for CSF or Blood, contrast dye Omnipaque (240mg/mL) was injected to confirm placement and there was no vascular runoff. 4 mL of the medication mixture listed above was injected slowly. Displacement of the radio opaque contrast after injection of the  medication confirmed that the medication went into the area of the epidural space. The needles were removed and bleeding was nil. A sterile dressing was applied. No specimens collected. The patient tolerated the procedure well.     PAIN BEFORE THE PROCEDURE: 7/10    PAIN AFTER THE PROCEDURE: 0/10   The patient was monitored after the procedure in the recovery area. They were given post-procedure and discharge instructions to follow at home. The patient was discharged in a stable condition.        Dustin Silva MD

## 2022-11-14 NOTE — DISCHARGE INSTRUCTIONS

## 2022-11-14 NOTE — INTERVAL H&P NOTE
The patient was examined and no significant changes were noted from the updated H&P or last clinic note.    The risks and benefits of this procedure, including alternative therapies, were discussed with the patient.  The discussion of risks included infection, bleeding, need for additional procedures or surgery, nerve damage, paralysis, adverse medication reaction(s), stroke, and if appropriate for the procedure, death.  Questions regarding the procedure, risks, expected outcome, and possible side effects were solicited and answered to Car's satisfaction.  Car Rivera wishes to proceed with the injection or procedure as confirmed by written consent.    Plan for L5/S1 interlaminar REINA today.    Active Hospital Problems    Diagnosis  POA    Lumbar radiculopathy [M54.16]  Yes      Resolved Hospital Problems   No resolved problems to display.

## 2022-11-14 NOTE — DISCHARGE SUMMARY
Discharge Note  Short Stay      SUMMARY     Admit Date: 11/14/2022    Attending Physician: Dustin Silva      Discharge Physician: Dutsin Silva      Discharge Date: 11/14/2022 10:46 AM    Procedure(s) (LRB):  LUMBAR L5/S1 REINA CONTRAST DIRECT REFERRAL (N/A)    Final Diagnosis: Lumbar radiculopathy [M54.16]    Disposition: Home or self care    Patient Instructions:   Current Discharge Medication List        CONTINUE these medications which have NOT CHANGED    Details   LIDOcaine (LIDODERM) 5 % Place 1 patch onto the skin daily as needed (pain). Remove & Discard patch within 12 hours or as directed by MD  Qty: 30 patch, Refills: 0    Associated Diagnoses: Lumbar radiculopathy; Acute midline low back pain with left-sided sciatica      naproxen (NAPROSYN) 500 MG tablet Take 1 tablet (500 mg total) by mouth 2 (two) times daily with meals.  Qty: 30 tablet, Refills: 0    Associated Diagnoses: Acute midline low back pain with left-sided sciatica                 Discharge Diagnosis: Lumbar radiculopathy [M54.16]  Condition on Discharge: Stable with no complications to procedure   Diet on Discharge: Same as before.  Activity: as per instruction sheet.  Discharge to: Home with a responsible adult.  Follow up: 2-4 weeks       Please call my office or pager at [phone] if experienced any weakness or loss of sensation, fever > 101.5, pain uncontrolled with oral medications, persistent nausea/vomiting/or diarrhea, redness or drainage from the incisions, or any other worrisome concerns. If physician on call was not reached or could not communicate with our office for any reason please go to the nearest emergency department

## 2022-11-16 ENCOUNTER — CLINICAL SUPPORT (OUTPATIENT)
Dept: REHABILITATION | Facility: HOSPITAL | Age: 32
End: 2022-11-16
Payer: OTHER MISCELLANEOUS

## 2022-11-16 ENCOUNTER — HOSPITAL ENCOUNTER (OUTPATIENT)
Dept: RADIOLOGY | Facility: OTHER | Age: 32
Discharge: HOME | End: 2022-11-16
Attending: NURSE PRACTITIONER
Payer: OTHER MISCELLANEOUS

## 2022-11-16 DIAGNOSIS — M79.605 PAIN IN LEFT LEG: Primary | ICD-10-CM

## 2022-11-16 DIAGNOSIS — M54.16 LUMBAR RADICULOPATHY: ICD-10-CM

## 2022-11-16 DIAGNOSIS — M54.42 ACUTE MIDLINE LOW BACK PAIN WITH LEFT-SIDED SCIATICA: ICD-10-CM

## 2022-11-16 DIAGNOSIS — R29.3 POSTURE ABNORMALITY: ICD-10-CM

## 2022-11-16 PROCEDURE — 97110 THERAPEUTIC EXERCISES: CPT

## 2022-11-16 PROCEDURE — 72120 X-RAY BEND ONLY L-S SPINE: CPT | Mod: TC,FY

## 2022-11-16 PROCEDURE — 97161 PT EVAL LOW COMPLEX 20 MIN: CPT

## 2022-11-16 PROCEDURE — 72120 XR LUMBAR SPINE FLEXION AND EXTENSION ONLY: ICD-10-PCS | Mod: 26,,, | Performed by: RADIOLOGY

## 2022-11-16 PROCEDURE — 72120 X-RAY BEND ONLY L-S SPINE: CPT | Mod: 26,,, | Performed by: RADIOLOGY

## 2022-11-17 ENCOUNTER — CLINICAL SUPPORT (OUTPATIENT)
Dept: REHABILITATION | Facility: HOSPITAL | Age: 32
End: 2022-11-17
Payer: OTHER MISCELLANEOUS

## 2022-11-17 DIAGNOSIS — R29.3 POSTURE ABNORMALITY: ICD-10-CM

## 2022-11-17 DIAGNOSIS — M79.605 PAIN IN LEFT LEG: ICD-10-CM

## 2022-11-17 DIAGNOSIS — M54.16 LUMBAR RADICULOPATHY: Primary | ICD-10-CM

## 2022-11-17 PROCEDURE — 97140 MANUAL THERAPY 1/> REGIONS: CPT

## 2022-11-17 PROCEDURE — 97112 NEUROMUSCULAR REEDUCATION: CPT

## 2022-11-17 PROCEDURE — 97110 THERAPEUTIC EXERCISES: CPT

## 2022-11-17 NOTE — PROGRESS NOTES
Physical Therapy Daily Treatment Note     Name: Car Guo [facility] Number: [id number]    Therapy Diagnosis:   Encounter Diagnoses   Name Primary?    Lumbar radiculopathy Yes    Pain in left leg     Posture abnormality      Physician: Eleonora Hand NP    Visit Date: 11/17/2022  Physician Orders: PT Eval and Treat   Medical Diagnosis from Referral: M54.16 (ICD-10-CM) - Lumbar radiculopathy  Evaluation Date: 11/16/2022  Authorization Period Expiration: 10/24/2023  Plan of Care Expiration: 5/16/2023  Visit # / Visits authorized: 2 / 12     Time In: 13:10  Time Out: 13:50  Total Appointment Time (timed & untimed codes): 40 minutes     Precautions: Standard    Subjective     Pt reports: feeling better. Feels like he can move more. No pain in his foot.  He was compliant with home exercise program.  Response to previous treatment: centralized symptoms  Functional change: improved transitional movement    Pain: 5/10  Location: left LE      Objective   Car received therapeutic exercises to develop strength, endurance, ROM, posture, and core stabilization for 12 minutes including:  Prone press ups with self overpressure x 10  Standing lumbar extension x 10  Education on HEP, PT plan, goals     Car received the following manual therapy techniques: Joint mobilizations and Soft tissue Mobilization were applied to the: lumbar spine for 08 minutes, including:   to thoracic and lumbar spine grade II-IV        Car participated in neuromuscular re-education activities to improve: Coordination, Proprioception, and Posture for 15 minutes. The following activities were included:  Lateral shift correction @ mirror in standing 15 x 5 sec holds  Pallof press in sitting 2x15 each 2 blue bands        Car participated in dynamic functional therapeutic activities to improve functional performance for 0  minutes, including:     Home Exercises and Patient Education Provided     Education  provided:   - HEP, PT plan, goals     Written Home Exercises Provided: yes.  Exercises were reviewed and patient was able to demonstrate them prior to the end of the session.  Patient demonstrated good  understanding of the education provided.      See EMR under Patient Instructions for exercisesprovided 11/16/2022.    Assessment     Improvements seen today - reduced adverse neural tension during SLR and slump testing. Had patient work on correcting lateral shift @ mirror. He experienced peripheralization and increased intensity of symptoms. Had patient then perform JOHNY with overpressure and symptoms centralized and reduced.     Car Is progressing well towards his goals.   Pt prognosis is Good.     Pt will continue to benefit from skilled outpatient physical therapy to address the deficits listed in the problem list box on initial evaluation, provide pt/family education and to maximize pt's level of independence in the home and community environment.     Pt's spiritual, cultural and educational needs considered and pt agreeable to plan of care and goals.    Anticipated barriers to physical therapy: none    Goals:   Short Term Goals:  6 weeks  1.Report no LE pain in foot  increase tolerance for walking and work duties  2. Increase ROM by 25% where limited in order to perform ADLs without difficulty.  3. Demo improved posture evident by eliminated lateral shift 50% of the time.  4. Pt to tolerate HEP to improve ROM and independence with ADL's     Long Term Goals: 12 weeks  1.Report decreased LE pain < / = 4/10  to increase tolerance for work duties.   2.Patient goal: work without pain or limitations.  3.Increase strength to 4+/5 in  left LE myotomes  to increase tolerance for ADL and work activities.  4. Pt will report >/= 60% FOTO  to demonstrate increase in LE function with every day tasks.   5. Demo improved posture evident by eliminated lateral shift 100% of the time.    Plan     Plan of care  Certification: 11/16/2022 to 5/16/2023.     Patient will be gone for work x 2 weeks and will return to PT when he gets back.    Outpatient Physical Therapy 2 times weekly for 6 months to include the following interventions: Manual Therapy, Neuromuscular Re-ed, Patient Education, Therapeutic Activities, and Therapeutic Exercise.    Latisha Zheng, PT , DPT, OCS

## 2022-11-17 NOTE — PLAN OF CARE
[facility]  [place] [id number]  Physical Therapy Initial Evaluation    Date: 11/16/2022   Name: Car Rivera  Clinic Number: [id number]    Therapy Diagnosis:   Encounter Diagnoses   Name Primary?    Lumbar radiculopathy     Pain in left leg Yes    Posture abnormality      Physician: Eleonora Hand, NP    Physician Orders: PT Eval and Treat   Medical Diagnosis from Referral: M54.16 (ICD-10-CM) - Lumbar radiculopathy  Evaluation Date: 11/16/2022  Authorization Period Expiration: 10/24/2023  Plan of Care Expiration: 5/16/2023  Visit # / Visits authorized: 1/ 12    Time In: 12:15  Time Out: 13:00  Total Appointment Time (timed & untimed codes): 45 minutes    Precautions: Standard    Subjective   Date of onset: August 18  History of current condition - Car reports: left sided leg pain in calf and down to foot that started in August while at work. He was lifting something while turned to one side.Since time of injury, he has experienced left sided low back pain with symptoms into left LE. He completed PT in [place] but said they mostly just did dry needling and it didn't help. He recently had an epidural injection which has helped with his low back pain.      Medical History:   No past medical history on file.    Surgical History:   Car Rivera  has a past surgical history that includes Epidural steroid injection (N/A, 11/14/2022).    Medications:   Car has a current medication list which includes the following prescription(s): lidocaine, naproxen, and tizanidine.    Allergies:   Review of patient's allergies indicates:  No Known Allergies     Imaging, MRI studies: At L4-5, central disc protrusion.  Moderate canal and mild left neural foraminal narrowing.     At L5-S1, large left paracentral disc protrusion contacting and resulting in posterior displacement of the descending left S1 nerve root.  Diffuse narrowing of the thecal sac related to degenerative change  and prominent anterior epidural lipomatosis.  Moderate left neural foraminal narrowing.    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     Objective     Observation: Right lateral shift    Lumbar Range of Motion:    Limitations Pain   Flexion 50%   increased        Extension 25%   decreased        Left Side Bending 50% increased     Right Side Bending 25% decreased            Lower Extremity Strength  Right LE  Left LE    Quadriceps: 5/5 Quadriceps: 5/5   Hamstrings: 5/5 Hamstrings: 4/5   Iliospoas: 5/5 Iliospoas: 5/5   Great toe extension: 5/5 Hip IR: 4/5   Ankle dorsiflexion: 5/5 Ankle dorsiflexion: 4/5   Ankle plantarflexion: 5/5 Ankle plantarflexion: 4+/5     Sensation:light touch intact      Special Tests:  -SLR Test: positive on the left for LE symptoms  -Repeated Flexion: increased pain  -Repeated Ext: reduced pain  -Slump Test: positive on the right for left sided pain; positive on the left    Joint Mobility: hypomobile throughout thoracic spine        Limitation/Restriction for FOTO low back Survey    Therapist reviewed FOTO scores for Car Rivera on 11/16/2022.   FOTO documents entered into [employer] - see Media section.    Limitation Score: 40%         TREATMENT   Treatment Time In: 12:30  Treatment  Time Out: 13:00  Total Treatment time (time-based codes) separate from Evaluation: 30 minutes    Car received therapeutic exercises to develop strength, endurance, ROM, posture, and core stabilization for 30 minutes including:  Prone press ups with self overpressure x 10  Supine sciatic nerve glide x 20 (knee component only)  Side glide correction at the wall x 15  Standing lumbar extension x 10  Education on HEP, PT plan, goals    Car received the following manual therapy techniques: Joint mobilizations and Soft tissue Mobilization were applied to the: lumbar spine for 0 minutes, including:      Car participated in neuromuscular re-education activities to improve: Coordination, Proprioception, and Posture for 0 minutes. The following activities were included:      Car participated in dynamic functional therapeutic activities to improve functional performance for 0  minutes, including:    Home Exercises and Patient Education Provided    Education provided:   - HEP, PT plan, goals    Written Home Exercises Provided: yes.  Exercises were reviewed and patient was able to demonstrate them prior to the end of the session.  Patient demonstrated good  understanding of the education provided.     See EMR under Patient Instructions for exercisesprovided 11/16/2022.    Assessment   Car is a 32 y.o. male referred to outpatient Physical Therapy with a medical diagnosis of lumbar radiculopathy. Pt presents with limited lumbar ROM, lateral shift in standing and sitting positions, L4-5 myotomal weakness, adverse neural tension, and limited functional movement.     Pt prognosis is Good.   Pt will benefit from skilled outpatient Physical Therapy to address the deficits stated above and in the chart below, provide pt/family education, and to maximize pt's level of independence.     Plan of care discussed with patient: Yes  Pt's spiritual, cultural and educational needs considered and patient is  agreeable to the plan of care and goals as stated below:     Anticipated Barriers for therapy: none    Medical Necessity is demonstrated by the following  History  Co-morbidities and personal factors that may impact the plan of care Co-morbidities:   none    Personal Factors:   no deficits     low   Examination  Body Structures and Functions, activity limitations and participation restrictions that may impact the plan of care Body Regions:   lower extremities  trunk    Body Systems:    ROM  strength  transitions  motor control    Participation Restrictions:   covid-19    Activity limitations:   Learning and applying knowledge  no deficits    General Tasks and Commands  no deficits    Communication  no deficits    Mobility  lifting and carrying objects  walking  driving (bike, car, motorcycle)    Self care  no deficits    Domestic Life  no deficits    Interactions/Relationships  no deficits    Life Areas  no deficits    Community and Social Life  no deficits         low   Clinical Presentation stable and uncomplicated low   Decision Making/ Complexity Score: low     Goals:  Short Term Goals:  6 weeks  1.Report no LE pain in foot  increase tolerance for walking and work duties  2. Increase ROM by 25% where limited in order to perform ADLs without difficulty.  3. Demo improved posture evident by eliminated lateral shift 50% of the time.  4. Pt to tolerate HEP to improve ROM and independence with ADL's    Long Term Goals: 12 weeks  1.Report decreased LE pain < / = 4/10  to increase tolerance for work duties.   2.Patient goal: work without pain or limitations.  3.Increase strength to 4+/5 in  left LE myotomes  to increase tolerance for ADL and work activities.  4. Pt will report >/= 60% FOTO  to demonstrate increase in LE function with every day tasks.   5. Demo improved posture evident by eliminated lateral shift 100% of the time.    Plan   Plan of care Certification: 11/16/2022 to 5/16/2023.    Outpatient Physical  Therapy 2 times weekly for 6 months to include the following interventions: Manual Therapy, Neuromuscular Re-ed, Patient Education, Therapeutic Activities, and Therapeutic Exercise.     Latisha Zheng, PT, DPT, OCS

## 2023-01-04 ENCOUNTER — TELEPHONE (OUTPATIENT)
Dept: NEUROSURGERY | Facility: CLINIC | Age: 33
End: 2023-01-04
Payer: OTHER MISCELLANEOUS

## 2023-01-04 NOTE — TELEPHONE ENCOUNTER
Pt was contacted and stated that he experiences nausea/ throwing up after going up 3-4 flight of stairs. He questioned if this is related to his back. I informed the pt that it is not related and he may need to speak with his PCP. The pt was also scheduled for his 3 month f/u with Eleonora.  ----- Message from Patricia Gil sent at 1/4/2023 11:47 AM CST -----  Contact: Pt  Pt is callback from provider staff. Pt is having some complications and need adv. Please adv.        Confirmed contact below:   Contact Name:Car Rivera  Phone Number: [phone]

## 2023-01-13 ENCOUNTER — PATIENT MESSAGE (OUTPATIENT)
Dept: REHABILITATION | Facility: HOSPITAL | Age: 33
End: 2023-01-13
Payer: OTHER MISCELLANEOUS

## 2023-01-17 ENCOUNTER — OFFICE VISIT (OUTPATIENT)
Dept: NEUROSURGERY | Facility: CLINIC | Age: 33
End: 2023-01-17
Payer: OTHER MISCELLANEOUS

## 2023-01-17 VITALS
SYSTOLIC BLOOD PRESSURE: 138 MMHG | WEIGHT: 274.94 LBS | HEIGHT: 73 IN | HEART RATE: 120 BPM | BODY MASS INDEX: 36.44 KG/M2 | DIASTOLIC BLOOD PRESSURE: 89 MMHG

## 2023-01-17 DIAGNOSIS — M54.16 LUMBAR RADICULOPATHY: Primary | ICD-10-CM

## 2023-01-17 DIAGNOSIS — M54.42 ACUTE MIDLINE LOW BACK PAIN WITH LEFT-SIDED SCIATICA: ICD-10-CM

## 2023-01-17 PROCEDURE — 99213 PR OFFICE/OUTPT VISIT, EST, LEVL III, 20-29 MIN: ICD-10-PCS | Mod: S$GLB,,, | Performed by: NURSE PRACTITIONER

## 2023-01-17 PROCEDURE — 99999 PR PBB SHADOW E&M-EST. PATIENT-LVL III: ICD-10-PCS | Mod: PBBFAC,,, | Performed by: NURSE PRACTITIONER

## 2023-01-17 PROCEDURE — 99999 PR PBB SHADOW E&M-EST. PATIENT-LVL III: CPT | Mod: PBBFAC,,, | Performed by: NURSE PRACTITIONER

## 2023-01-17 PROCEDURE — 99213 OFFICE O/P EST LOW 20 MIN: CPT | Mod: S$GLB,,, | Performed by: NURSE PRACTITIONER

## 2023-01-17 RX ORDER — IBUPROFEN 200 MG
200 TABLET ORAL EVERY 6 HOURS PRN
COMMUNITY
End: 2023-10-05

## 2023-01-17 NOTE — PROGRESS NOTES
Neurosurgery  Established Patient    SUBJECTIVE:     History of Present Illness: Car Rivera is a 32 y.o. male being seen in clinic today to discuss concerns with lumbar radiculopathy. 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 He has also been taking naproxen and Zanaflex with mild relief. States that his symptoms have improved slightly since the initial injury. Rates his pain today as a 7/10. Describes the pain as constant and aching on the left-side of the low back with radiation down the posterior and lateral aspect of the left leg associated with numbness and tingling. Aggravating factors include standing and walking. Alleviating factors include frequent position changes and medications. Denies weakness, b/b dysfunction, saddle anesthesia, or gait instability. He is scheduled to return to work next week.       Interval Hx 1/17/23: After the last appointment, the patient was apprehensive about surgery so injections were ordered. He obtained the L5/S1 REINA on 11/14/22. States that he obtained about 3 weeks of relief with the injection; however, he continued to work and lately the pain has worsened affecting his ability to work. He would like to discuss surgical options. The pain continues to radiate down the posterior and lateral aspect of the left leg. Denies right leg symptoms. He also obtained 2 additional PT sessions without relief. Denies weakness, b/b dysfunction, saddle anesthesia, or gait instability.    Review of patient's allergies indicates:  No Known Allergies    Current Outpatient Medications   Medication Sig Dispense Refill    ibuprofen (ADVIL,MOTRIN) 200 MG tablet Take 200 mg by mouth every 6 (six) hours as needed for Pain.      naproxen (NAPROSYN) 500 MG tablet Take 1 tablet (500 mg total) by mouth 2 (two) times daily with meals. (Patient  "not taking: Reported on 1/17/2023) 30 tablet 0     No current facility-administered medications for this visit.       History reviewed. No pertinent past medical history.  Past Surgical History:   Procedure Laterality Date    EPIDURAL STEROID INJECTION N/A 11/14/2022    Procedure: LUMBAR L5/S1 REINA CONTRAST DIRECT REFERRAL;  Surgeon: Dustin Silva MD;  Location: [facility];  Service: Pain Management;  Laterality: N/A;     Family History    None       Social History     Socioeconomic History    Marital status:    Tobacco Use    Smoking status: Never    Smokeless tobacco: Never       Review of Systems   Constitutional:  Negative for activity change, appetite change and fever.   HENT:  Negative for hearing loss and postnasal drip.    Eyes:  Negative for pain and visual disturbance.   Respiratory:  Negative for shortness of breath.    Cardiovascular:  Negative for chest pain and palpitations.   Gastrointestinal:  Negative for abdominal pain.   Endocrine: Negative for polydipsia, polyphagia and polyuria.   Musculoskeletal:  Positive for arthralgias, back pain and myalgias. Negative for gait problem.   Neurological:  Positive for numbness. Negative for dizziness, weakness and headaches.   Psychiatric/Behavioral:  Negative for confusion and dysphoric mood.      OBJECTIVE:     Vital Signs  Pulse: (!) 120  BP: 138/89  Pain Score:   7  Height: 6' 1" (185.4 cm)  Weight: 124.7 kg (274 lb 14.6 oz)  Body mass index is 36.27 kg/m².    Neurosurgery Physical Exam  General: well developed, well nourished, no distress.   Head: normocephalic, atraumatic  Neurologic: Alert and oriented. Thought content appropriate.  GCS: Motor: 6/Verbal: 5/Eyes: 4 GCS Total: 15  Mental Status: Awake, Alert, Oriented x 4  Language: No aphasia  Speech: No dysarthria  Cranial nerves: face symmetric, tongue midline, CN II-XII grossly intact.   Eyes: pupils equal, round, reactive to light with accomodation, EOMI.   Pulmonary: normal respirations, no " signs of respiratory distress  Abdomen: soft, non-distended  Skin: Skin is warm, dry and intact.  Sensory: intact to light touch throughout  Motor Strength:Moves all extremities spontaneously with good tone.    Strength   Deltoids Triceps Biceps Wrist Extension Wrist Flexion Hand    Upper: R 5/5 5/5 5/5 5/5 5/5 5/5     L 5/5 5/5 5/5 5/5 5/5 5/5       HF KE KF DF PF EHL   Lower: R 5/5 5/5 5/5 5/5 5/5 5/5     L 5/5 5/5 5/5 5/5 5/5 5/5         Cerebellar:   Gait stable, antalgic     Cervical:   ROM: Full with flexion, extension, lateral rotation and ear-to-shoulder bend.   Midline TTP: Negative.     Thoracic:  Midline TTP: Negative.     Lumbar:  Midline TTP: Positive  Straight Leg Test: Positive LEFT     Other:  SI joint TTP: Negative.  Greater trochanter TTP: Negative.  Tenderness with external/internal hip rotation: Negative.       Diagnostic Results:  There is no new imaging to review for this encounter.      ASSESSMENT/PLAN:   Car Rivera is a 32 y.o. male followed by workman's comp for lumbar radiculopathy due to a large disc protrusion at L5/S1. He has obtained PT and an injection with mild relief; however, the pain has progressed. He is now interested in surgical options as the pain is interfering with his ability to work. He was encouraged to also follow-up with pain management in the interim for additional pain relief options.     I would like the patient to follow-up in clinic with Dr. Whitfield to discuss surgical options. I have encouraged him to contact the clinic with any questions, concerns, or adverse clinical changes. He verbalized understanding.      KEYUR Crum-ROSIE  Neurosurgery  [facility].      Note dictated with voice recognition software, please excuse any grammatical errors.

## 2023-01-19 ENCOUNTER — CLINICAL SUPPORT (OUTPATIENT)
Dept: REHABILITATION | Facility: HOSPITAL | Age: 33
End: 2023-01-19

## 2023-01-19 DIAGNOSIS — M79.605 PAIN IN LEFT LEG: ICD-10-CM

## 2023-01-19 DIAGNOSIS — M54.16 LUMBAR RADICULOPATHY: Primary | ICD-10-CM

## 2023-01-19 DIAGNOSIS — R29.3 POSTURE ABNORMALITY: ICD-10-CM

## 2023-01-19 PROCEDURE — 97140 MANUAL THERAPY 1/> REGIONS: CPT

## 2023-01-19 PROCEDURE — 97112 NEUROMUSCULAR REEDUCATION: CPT

## 2023-01-19 PROCEDURE — 97110 THERAPEUTIC EXERCISES: CPT

## 2023-01-20 ENCOUNTER — TELEPHONE (OUTPATIENT)
Dept: PAIN MEDICINE | Facility: CLINIC | Age: 33
End: 2023-01-20
Payer: OTHER MISCELLANEOUS

## 2023-01-20 NOTE — TELEPHONE ENCOUNTER
----- Message from Orquidea Walsh sent at 1/20/2023  9:39 AM CST -----  Regarding: Sooner Appt Request  Who Is Calling : YANIRA BARRIOS [[id number]]        Reason For The Call: Patient is requesting a sooner appointment.  Patient declined first available and does not want to be added to the waitlist.  Please contact the patient to schedule.        Preferred Contact Method: [phone]        Additional Information: Bulging disc in back and neurologist set patient up to see pain management but appointment was canceled by provider. Patient is needing to be seen as soon as possible.

## 2023-01-23 ENCOUNTER — OFFICE VISIT (OUTPATIENT)
Dept: NEUROSURGERY | Facility: CLINIC | Age: 33
End: 2023-01-23
Payer: OTHER MISCELLANEOUS

## 2023-01-23 ENCOUNTER — TELEPHONE (OUTPATIENT)
Dept: NEUROSURGERY | Facility: CLINIC | Age: 33
End: 2023-01-23

## 2023-01-23 ENCOUNTER — PATIENT MESSAGE (OUTPATIENT)
Dept: NEUROSURGERY | Facility: CLINIC | Age: 33
End: 2023-01-23

## 2023-01-23 VITALS
SYSTOLIC BLOOD PRESSURE: 154 MMHG | BODY MASS INDEX: 36.31 KG/M2 | DIASTOLIC BLOOD PRESSURE: 106 MMHG | HEIGHT: 73 IN | WEIGHT: 274 LBS | HEART RATE: 120 BPM

## 2023-01-23 DIAGNOSIS — M79.605 PAIN IN LEFT LEG: Primary | ICD-10-CM

## 2023-01-23 DIAGNOSIS — M48.061 SPINAL STENOSIS OF LUMBAR REGION WITHOUT NEUROGENIC CLAUDICATION: ICD-10-CM

## 2023-01-23 DIAGNOSIS — M54.16 LUMBAR RADICULOPATHY: ICD-10-CM

## 2023-01-23 PROCEDURE — 99999 PR PBB SHADOW E&M-EST. PATIENT-LVL III: CPT | Mod: PBBFAC,,, | Performed by: NEUROLOGICAL SURGERY

## 2023-01-23 PROCEDURE — 99999 PR PBB SHADOW E&M-EST. PATIENT-LVL III: ICD-10-PCS | Mod: PBBFAC,,, | Performed by: NEUROLOGICAL SURGERY

## 2023-01-23 PROCEDURE — 99215 PR OFFICE/OUTPT VISIT, EST, LEVL V, 40-54 MIN: ICD-10-PCS | Mod: S$GLB,,, | Performed by: NEUROLOGICAL SURGERY

## 2023-01-23 PROCEDURE — 99215 OFFICE O/P EST HI 40 MIN: CPT | Mod: S$GLB,,, | Performed by: NEUROLOGICAL SURGERY

## 2023-01-23 NOTE — PROGRESS NOTES
Neurosurgery  History & Physical    SUBJECTIVE:     Chief Complaint:  Lumbar radiculopathy    History of Present Illness:  History of Present Illness: Car Rivera is a 32 y.o. male being seen in clinic today to discuss concerns with lumbar radiculopathy. 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 He has also been taking naproxen and Zanaflex with mild relief. States that his symptoms have improved slightly since the initial injury. Rates his pain today as a 7/10. Describes the pain as constant and aching on the left-side of the low back with radiation down the posterior and lateral aspect of the left leg associated with numbness and tingling. Aggravating factors include standing and walking. Alleviating factors include frequent position changes and medications. Denies weakness, b/b dysfunction, saddle anesthesia, or gait instability. He is scheduled to return to work next week.        Interval Hx 1/17/23: After the last appointment, the patient was apprehensive about surgery so injections were ordered. He obtained the L5/S1 REINA on 11/14/22. States that he obtained about 3 weeks of relief with the injection; however, he continued to work and lately the pain has worsened affecting his ability to work. He would like to discuss surgical options. The pain continues to radiate down the posterior and lateral aspect of the left leg. Denies right leg symptoms. He also obtained 2 additional PT sessions without relief. Denies weakness, b/b dysfunction, saddle anesthesia, or gait instability    Interval history 01/23/2023:  Patient underwent L5/S1 epidural steroid injections on 11/14/2022.  He previously had some relief.  He then had worsening affecting his ability to work.  He would like to discuss surgical options at this particular time.  Continues to have pain that  "radiates down the posterior and lateral aspect of the left leg.  Denies any right leg symptoms.  Denies any debra weakness, bowel or bladder dysfunction, saddle anesthesia, gait instability.    Review of patient's allergies indicates:  No Known Allergies    Current Outpatient Medications   Medication Sig Dispense Refill    ibuprofen (ADVIL,MOTRIN) 200 MG tablet Take 200 mg by mouth every 6 (six) hours as needed for Pain.      naproxen (NAPROSYN) 500 MG tablet Take 1 tablet (500 mg total) by mouth 2 (two) times daily with meals. (Patient not taking: Reported on 1/17/2023) 30 tablet 0     No current facility-administered medications for this visit.       History reviewed. No pertinent past medical history.  Past Surgical History:   Procedure Laterality Date    EPIDURAL STEROID INJECTION N/A 11/14/2022    Procedure: LUMBAR L5/S1 REINA CONTRAST DIRECT REFERRAL;  Surgeon: Dustin Silva MD;  Location: [facility];  Service: Pain Management;  Laterality: N/A;     Family History    None       Social History     Socioeconomic History    Marital status:    Tobacco Use    Smoking status: Never    Smokeless tobacco: Never       Review of Systems    OBJECTIVE:     Vital Signs  Pulse: (!) 120  BP: (!) 154/106  Pain Score:   7  Height: 6' 1" (185.4 cm)  Weight: 124.3 kg (274 lb)  Body mass index is 36.15 kg/m².      Neurosurgery Physical Exam  General: well developed, well nourished, no distress.   Head: normocephalic, atraumatic  Neurologic: Alert and oriented. Thought content appropriate.  GCS: Motor: 6/Verbal: 5/Eyes: 4 GCS Total: 15  Mental Status: Awake, Alert, Oriented x 4  Language: No aphasia  Speech: No dysarthria  Cranial nerves: face symmetric, tongue midline, CN II-XII grossly intact.   Eyes: pupils equal, round, reactive to light with accomodation, EOMI.   Pulmonary: normal respirations, no signs of respiratory distress  Abdomen: soft, non-distended  Skin: Skin is warm, dry and intact.  Sensory: intact to light " touch throughout  Motor Strength:Moves all extremities spontaneously with good tone.    Strength   Deltoids Triceps Biceps Wrist Extension Wrist Flexion Hand    Upper: R 5/5 5/5 5/5 5/5 5/5 5/5     L 5/5 5/5 5/5 5/5 5/5 5/5       HF KE KF DF PF EHL   Lower: R 5/5 5/5 5/5 5/5 5/5 5/5     L 5/5 5/5 5/5 5/5 5/5 5/5         Cerebellar:   Gait stable, antalgic     Cervical:   ROM: Full with flexion, extension, lateral rotation and ear-to-shoulder bend.   Midline TTP: Negative.     Thoracic:  Midline TTP: Negative.     Lumbar:  Midline TTP: Positive  Straight Leg Test: Positive LEFT     Other:  SI joint TTP: Negative.  Greater trochanter TTP: Negative.  Tenderness with external/internal hip rotation: Negative.          Diagnostic Results:  I personally reviewed patient's Diagnostic Imaging.  Previous MRI of the lumbar spine 09/30/2022 with somewhat straightening of the lumbar spine, disc protrusion at L4/L5, L5/S1.  Moderate canal stenosis with impingement upon the descending nerve root appreciated at L5/S1 on the left    ASSESSMENT/PLAN:     32-year-old man with L5/S1 radiculopathy, refractory toward conservative treatment.      1. No focal deficits on examination in clinic   2. MRI of the lumbar spine with L4/L5 disc protrusion, L5/S1 disc protrusion.  Laterality of the disc at L5/S1 on the left.  No focal segmental instability on flexion-extension plain films.  Relative loss of lumbar lordosis.  3. Had long discussion with the patient.  Given his persistent left S1 radicular symptoms, with left L5/S1 disc protrusion, and persistence despite physical therapy, and epidural steroid injections.  I do recommend decompression with a microdiskectomy.  I discussed with the patient the risks, benefits, and alternatives to the procedure including not limited to heart attack coma, stroke, infection, bleeding, paralysis, even death.  I discussed the risk of possible worsening of current status, no change in clinical exam, or  improvement.  The patient understood the risks, benefits, and alternatives to the procedure and elected to proceed after informed consent was obtained and secured in the patient's chart.    I answered all the patient's questions to his satisfaction.  Thank you so very much for allowing me to participate in the care of this patient.  Please feel free to call any questions, comments, or concerns.    Jessica Whitfield MD,MSc  Department of Neurosurgery   Department of Radiology  Department of Neurology  [facility]    [facility]   [employer] [facility] [employer] [facility] [employer]    Total time spent in counseling and discussion about further management options including relevant lab work, treatment,  prognosis, medications and intended side effects was more than 60 minutes. More than 50 % of the time was spent in counseling and coordination of care.  I spent a total of 60 minutes on the day of the visit.This includes face to face time and non-face to face time preparing to see the patient (eg, review of tests), Obtaining and/or reviewing separately obtained history, Documenting clinical information in the electronic or other health record, Independently interpreting resultsand communicating results to the patient/family/caregiver, or Care coordination           Note dictated with voice recognition software, please excuse any grammatical errors.

## 2023-01-23 NOTE — TELEPHONE ENCOUNTER
Met with pt. Consents signed. Pre clearance form given to pt. Answered all questions. Pt verbalized understanding.

## 2023-01-23 NOTE — PROGRESS NOTES
Physical Therapy Daily Treatment Note     Name: Car Guo [facility] Number: [id number]    Therapy Diagnosis:   Encounter Diagnoses   Name Primary?    Lumbar radiculopathy Yes    Pain in left leg     Posture abnormality      Physician: Eleonora Hand NP    Visit Date: 1/19/2023  Physician Orders: PT Eval and Treat   Medical Diagnosis from Referral: M54.16 (ICD-10-CM) - Lumbar radiculopathy  Evaluation Date: 11/16/2022  Authorization Period Expiration: 10/24/2023  Plan of Care Expiration: 5/16/2023  Visit # / Visits authorized:  1 / 20     Time In: 14:05  Time Out: 15:00  Total Appointment Time (timed & untimed codes): 55 minutes     Precautions: Standard    Subjective     Pt reports: he has been out of PT due to work. States that pain is worse than ever. He has pain and tingling down his leg throughout the day. Also, posture gets worse as the day goes on. He plans to have surgery.  He was compliant with home exercise program.  Response to previous treatment: unable to assess  Functional change: unable to assess    Pain: 5/10  Location: left LE      Objective   Car received therapeutic exercises to develop strength, endurance, ROM, posture, and core stabilization for 30 minutes including:  Prone lying x 3 min  Sciatic nerve glide supine x 30 each  Prone press ups with self overpressure x 10  Standing lumbar extension x 10  left side glides @ wall x 15     Car received the following manual therapy techniques: Joint mobilizations and Soft tissue Mobilization were applied to the: lumbar spine for 00 minutes, including:   to thoracic and lumbar spine grade II-IV        Car participated in neuromuscular re-education activities to improve: Coordination, Proprioception, and Posture for 15 minutes. The following activities were included:  Lateral shift correction @ mirror in standing 15 x 5 sec holds  Pallof press in sitting 2x15 each 2 blue bands  Pull downs blue band seated  3x15        Car participated in dynamic functional therapeutic activities to improve functional performance for 0  minutes, including:     Home Exercises and Patient Education Provided     Education provided:   - HEP, PT plan, goals     Written Home Exercises Provided: yes.  Exercises were reviewed and patient was able to demonstrate them prior to the end of the session.  Patient demonstrated good  understanding of the education provided.      See EMR under Patient Instructions for exercisesprovided 11/16/2022.    Assessment     Noted worsened posture today with increased lateral shift and increased trunk flexion in standing and walking postures. Able to improve with prone lying and prone press ups, but improvement lasted no longer than 10 minutes. Patient had good tolerance to seated core stability work, but unable to perform any core stability in standing.     Car Is progressing well towards his goals.   Pt prognosis is Good.     Pt will continue to benefit from skilled outpatient physical therapy to address the deficits listed in the problem list box on initial evaluation, provide pt/family education and to maximize pt's level of independence in the home and community environment.     Pt's spiritual, cultural and educational needs considered and pt agreeable to plan of care and goals.    Anticipated barriers to physical therapy: none    Goals:   Short Term Goals:  6 weeks  1.Report no LE pain in foot  increase tolerance for walking and work duties  2. Increase ROM by 25% where limited in order to perform ADLs without difficulty.  3. Demo improved posture evident by eliminated lateral shift 50% of the time.  4. Pt to tolerate HEP to improve ROM and independence with ADL's     Long Term Goals: 12 weeks  1.Report decreased LE pain < / = 4/10  to increase tolerance for work duties.   2.Patient goal: work without pain or limitations.  3.Increase strength to 4+/5 in  left LE myotomes  to increase  tolerance for ADL and work activities.  4. Pt will report >/= 60% FOTO  to demonstrate increase in LE function with every day tasks.   5. Demo improved posture evident by eliminated lateral shift 100% of the time.    Plan     Plan of care Certification: 11/16/2022 to 5/16/2023.     Patient will be gone for work x 2 weeks and will return to PT when he gets back.    Outpatient Physical Therapy 2 times weekly for 6 months to include the following interventions: Manual Therapy, Neuromuscular Re-ed, Patient Education, Therapeutic Activities, and Therapeutic Exercise.    Latisha Zheng, PT , DPT, OCS

## 2023-01-24 ENCOUNTER — TELEPHONE (OUTPATIENT)
Dept: NEUROSURGERY | Facility: CLINIC | Age: 33
End: 2023-01-24
Payer: OTHER MISCELLANEOUS

## 2023-01-24 DIAGNOSIS — M54.16 LUMBAR RADICULOPATHY: Primary | ICD-10-CM

## 2023-01-25 ENCOUNTER — PATIENT MESSAGE (OUTPATIENT)
Dept: PAIN MEDICINE | Facility: CLINIC | Age: 33
End: 2023-01-25
Payer: OTHER MISCELLANEOUS

## 2023-01-27 ENCOUNTER — TELEPHONE (OUTPATIENT)
Dept: NEUROSURGERY | Facility: CLINIC | Age: 33
End: 2023-01-27
Payer: OTHER MISCELLANEOUS

## 2023-01-27 DIAGNOSIS — M48.061 SPINAL STENOSIS OF LUMBAR REGION WITHOUT NEUROGENIC CLAUDICATION: ICD-10-CM

## 2023-01-27 DIAGNOSIS — M54.16 LUMBAR RADICULOPATHY: Primary | ICD-10-CM

## 2023-01-27 DIAGNOSIS — M54.42 ACUTE MIDLINE LOW BACK PAIN WITH LEFT-SIDED SCIATICA: ICD-10-CM

## 2023-01-27 DIAGNOSIS — M79.605 PAIN IN LEFT LEG: ICD-10-CM

## 2023-01-27 NOTE — TELEPHONE ENCOUNTER
----- Message from Tremontana Chevalier sent at 1/27/2023 10:41 AM CST -----  Regarding: appt/orders   Pt calling to spk with someone in Dr. Whitfield's office to discuss why the appt on 01/24, changed to 01/27 and then cancled on 01/27 for X-Ray Scoliosis Complete. WC case. Pls cll pt @ [phone]

## 2023-01-30 ENCOUNTER — TELEPHONE (OUTPATIENT)
Dept: NEUROSURGERY | Facility: CLINIC | Age: 33
End: 2023-01-30
Payer: OTHER MISCELLANEOUS

## 2023-01-30 NOTE — TELEPHONE ENCOUNTER
----- Message from Rosita Cordova sent at 1/27/2023  7:58 AM CST -----  Regarding: RE: pt advice  Forwarding to Employee Connect Team to assist.     Rosita Espinosa  ----- Message -----  From: Rosario Burdick RN  Sent: 1/26/2023   1:44 PM CST  To: Workers Compensation Pres[employer]  Subject: FW: pt advice                                    Can someone pls assist ?  ----- Message -----  From: Tremontana Chevalier  Sent: 1/26/2023   1:32 PM CST  To: Roseann CURRY Staff  Subject: pt advice                                        samy Qiu's  worker clling to s/w someone in Dr. Whitfield's offoice to get the clinical notes and the bill for pt's last visit. Pls call Uyen @ [phone]/fax .

## 2023-01-30 NOTE — TELEPHONE ENCOUNTER
----- Message from Claudia Wren sent at 1/27/2023  8:08 AM CST -----  Regarding: RE: pt advice  These have been faxed to the [employer]  ----- Message -----  From: Rosita Cordova  Sent: 1/27/2023   7:59 AM CST  To: Mónica Stearns, Rosario Burdick RN, #  Subject: RE: pt advice                                    Forwarding to Employee Connect Team to assist.     Rosita Espinosa  ----- Message -----  From: Rosario Burdick RN  Sent: 1/26/2023   1:44 PM CST  To: Workers Compensation Preserv  Subject: FW: pt advice                                    Can someone pls assist ?  ----- Message -----  From: Tremontana Chevalier  Sent: 1/26/2023   1:32 PM CST  To: Roseann CURRY Staff  Subject: pt advice                                        samy Qiu's WC worker clling to s/w someone in Dr. Whitfield's offoice to get the clinical notes and the bill for pt's last visit. Pls call Uyen @ [phone]/fax .

## 2023-02-03 ENCOUNTER — TELEPHONE (OUTPATIENT)
Dept: NEUROSURGERY | Facility: CLINIC | Age: 33
End: 2023-02-03
Payer: OTHER MISCELLANEOUS

## 2023-02-03 ENCOUNTER — TELEPHONE (OUTPATIENT)
Dept: PAIN MEDICINE | Facility: CLINIC | Age: 33
End: 2023-02-03
Payer: OTHER MISCELLANEOUS

## 2023-02-03 ENCOUNTER — PATIENT MESSAGE (OUTPATIENT)
Dept: NEUROSURGERY | Facility: CLINIC | Age: 33
End: 2023-02-03
Payer: OTHER MISCELLANEOUS

## 2023-02-03 ENCOUNTER — PATIENT MESSAGE (OUTPATIENT)
Dept: SURGERY | Facility: HOSPITAL | Age: 33
End: 2023-02-03
Payer: OTHER MISCELLANEOUS

## 2023-02-03 DIAGNOSIS — M54.42 ACUTE MIDLINE LOW BACK PAIN WITH LEFT-SIDED SCIATICA: ICD-10-CM

## 2023-02-03 DIAGNOSIS — M48.061 SPINAL STENOSIS OF LUMBAR REGION WITHOUT NEUROGENIC CLAUDICATION: ICD-10-CM

## 2023-02-03 DIAGNOSIS — M54.16 LUMBAR RADICULOPATHY: Primary | ICD-10-CM

## 2023-02-03 RX ORDER — GABAPENTIN 300 MG/1
300 CAPSULE ORAL 3 TIMES DAILY
Qty: 90 CAPSULE | Refills: 11 | Status: SHIPPED | OUTPATIENT
Start: 2023-02-03 | End: 2024-02-03

## 2023-02-03 RX ORDER — METHYLPREDNISOLONE 4 MG/1
TABLET ORAL
Qty: 21 EACH | Refills: 0 | Status: SHIPPED | OUTPATIENT
Start: 2023-02-03 | End: 2023-02-24

## 2023-02-03 RX ORDER — TIZANIDINE 2 MG/1
4 TABLET ORAL EVERY 8 HOURS PRN
Qty: 30 TABLET | Refills: 0 | Status: SHIPPED | OUTPATIENT
Start: 2023-02-03 | End: 2023-02-13

## 2023-02-03 NOTE — TELEPHONE ENCOUNTER
Called and spoke with pt. He stated that he did not call. He also stated that according to his workers comp he is not ok'd to be seen by us. He is now seeing another physician. Stated I will cancel the surgery.

## 2023-02-03 NOTE — TELEPHONE ENCOUNTER
Staff contacted Uyen and informed her that the patient was a direct referral for  to perform an injection.  will have to inform staff that he is willing to see the patient in clinic as a WC before an appt can be scheduled. Uyen verbalized understanding

## 2023-02-03 NOTE — TELEPHONE ENCOUNTER
Returned call to pt. Pt stated he misunderstood and thought I was a different provider. He is staying with Dr. Whitfield

## 2023-02-03 NOTE — TELEPHONE ENCOUNTER
----- Message from Orquidea  sent at 2/3/2023  8:34 AM CST -----  Regarding: Sooner Appt Request  Who Is Calling : Uyen with [employer] on behalf of YANIRA BARRIOS [[id number]]        Reason For The Call: Patient is requesting a sooner appointment.  Please contact the patient to schedule.        Preferred Contact Method:  [phone]        Additional Information: Patient has already been seeing Dr. Silva. He has a workman's comp case and the rep is attempting to get him on a consistent schedule.

## 2023-02-03 NOTE — TELEPHONE ENCOUNTER
----- Message from Francoise Bolden sent at 2/3/2023  8:58 AM CST -----  Regarding: Returning a call  Contact: : Uyen with [employer] on behalf of YANIRA BARRIOS [[id number]]  Type:  Patient Returning Call    Who Called: : Uyen with [employer] on behalf of YANIRA BARRIOS [[id number]]  Who Left Message for Patient:Kenyatta   Does the patient know what this is regarding?:Scheduling ( workman's comp pt )   Would the patient rather a call back or a response via [facility]? Call back   Best Call Back Number:[phone]    Additional Information: Patient has already been seeing Dr. Silva. He has a workman's comp case and the rep is attempting to get him on a consistent schedule.          .

## 2023-02-03 NOTE — TELEPHONE ENCOUNTER
----- Message from Sung Mitchell LPN sent at 2/3/2023  2:45 PM CST -----  This patient is calling for assistance from several areas stating he's increased amount of pain.  Per Uyen, the , he's told her that he's gone to 2 urgent cares recently due to the pain.  Please advise.    Thanks,  German

## 2023-02-23 ENCOUNTER — TELEPHONE (OUTPATIENT)
Dept: NEUROSURGERY | Facility: CLINIC | Age: 33
End: 2023-02-23
Payer: OTHER MISCELLANEOUS

## 2023-02-23 NOTE — TELEPHONE ENCOUNTER
----- Message from Mónica Stearns sent at 2/23/2023 12:26 PM CST -----  Regarding: RE: Surgery  We work off of referrals there is nothing in pt chart for an xray    ----- Message -----  From: Rosario Burdick RN  Sent: 2/23/2023  10:07 AM CST  To: Mónica Stearns  Subject: RE: Surgery                                      L5-S1. Mónica we have been waiting for approval for a scoliosis xray. Have not gotten yet. Dr. Whitfield would like this done before surgery. Is this something you can help with?  ----- Message -----  From: Mónica Stearns  Sent: 2/23/2023   9:43 AM CST  To: Roseann CURRY Staff  Subject: Surgery                                          Hello    Work comp is requesting the levels to be operated on for Surgery set [id number] with Dr Whitfield to complete their determination review  Please provide the information as soon as possible  Thank you

## 2023-02-23 NOTE — TELEPHONE ENCOUNTER
----- Message from Patricia Gil sent at 2/23/2023  1:25 PM CST -----  Contact: Pt  Pt is calling back from a missed call.   Pt. Would like a call back from Heidy.      Confirmed contact below:   Contact Name:Car Rivera  Phone Number: [phone]

## 2023-02-27 ENCOUNTER — PATIENT MESSAGE (OUTPATIENT)
Dept: SURGERY | Facility: HOSPITAL | Age: 33
End: 2023-02-27
Payer: OTHER MISCELLANEOUS

## 2023-03-01 ENCOUNTER — PATIENT MESSAGE (OUTPATIENT)
Dept: SURGERY | Facility: HOSPITAL | Age: 33
End: 2023-03-01
Payer: OTHER MISCELLANEOUS

## 2023-03-02 ENCOUNTER — HOSPITAL ENCOUNTER (OUTPATIENT)
Dept: RADIOLOGY | Facility: OTHER | Age: 33
Discharge: HOME | End: 2023-03-02
Attending: NEUROLOGICAL SURGERY
Payer: OTHER MISCELLANEOUS

## 2023-03-02 DIAGNOSIS — M79.605 PAIN IN LEFT LEG: ICD-10-CM

## 2023-03-02 DIAGNOSIS — M54.42 ACUTE MIDLINE LOW BACK PAIN WITH LEFT-SIDED SCIATICA: ICD-10-CM

## 2023-03-02 DIAGNOSIS — M48.061 SPINAL STENOSIS OF LUMBAR REGION WITHOUT NEUROGENIC CLAUDICATION: ICD-10-CM

## 2023-03-02 DIAGNOSIS — M54.16 LUMBAR RADICULOPATHY: ICD-10-CM

## 2023-03-02 PROCEDURE — 72082 XR SCOLIOSIS COMPLETE: ICD-10-PCS | Mod: 26,,, | Performed by: STUDENT IN AN ORGANIZED HEALTH CARE EDUCATION/TRAINING PROGRAM

## 2023-03-02 PROCEDURE — 72082 X-RAY EXAM ENTIRE SPI 2/3 VW: CPT | Mod: 26,,, | Performed by: STUDENT IN AN ORGANIZED HEALTH CARE EDUCATION/TRAINING PROGRAM

## 2023-03-02 PROCEDURE — 72082 X-RAY EXAM ENTIRE SPI 2/3 VW: CPT | Mod: TC,FY

## 2023-03-06 ENCOUNTER — PATIENT MESSAGE (OUTPATIENT)
Dept: ADMINISTRATIVE | Facility: OTHER | Age: 33
End: 2023-03-06
Payer: OTHER MISCELLANEOUS

## 2023-03-07 ENCOUNTER — PATIENT MESSAGE (OUTPATIENT)
Dept: NEUROSURGERY | Facility: CLINIC | Age: 33
End: 2023-03-07
Payer: OTHER MISCELLANEOUS

## 2023-03-07 ENCOUNTER — ANESTHESIA EVENT (OUTPATIENT)
Dept: SURGERY | Facility: HOSPITAL | Age: 33
End: 2023-03-07
Payer: OTHER MISCELLANEOUS

## 2023-03-07 NOTE — ANESTHESIA PREPROCEDURE EVALUATION
[facility]  Anesthesia Pre-Operative Evaluation         Patient Name: Car Rivera  YOB: 1990  MRN: [id number]    SUBJECTIVE:     Pre-operative evaluation for Procedure(s) (LRB):  DISCECTOMY, SPINE, MINIMALLY INVASIVE, USING MICROSCOPE (Left)     03/07/2023    Car Rivera is a 32 y.o. male w/ a significant PMHx of lumbar radiculopathy.    Patient now presents for the above procedure(s).      LDA: None documented.       Prev airway: None documented.    Drips: None documented.      Patient Active Problem List   Diagnosis    Lumbar radiculopathy    Pain in left leg    Posture abnormality       Review of patient's allergies indicates:  No Known Allergies    Current Inpatient Medications:      No current facility-administered medications on file prior to encounter.     Current Outpatient Medications on File Prior to Encounter   Medication Sig Dispense Refill    ibuprofen (ADVIL,MOTRIN) 200 MG tablet Take 200 mg by mouth every 6 (six) hours as needed for Pain.      naproxen (NAPROSYN) 500 MG tablet Take 1 tablet (500 mg total) by mouth 2 (two) times daily with meals. (Patient not taking: Reported on 1/17/2023) 30 tablet 0       Past Surgical History:   Procedure Laterality Date    EPIDURAL STEROID INJECTION N/A 11/14/2022    Procedure: LUMBAR L5/S1 REINA CONTRAST DIRECT REFERRAL;  Surgeon: Dustin Silva MD;  Location: [facility];  Service: Pain Management;  Laterality: N/A;       Social History     Socioeconomic History    Marital status: Other   Tobacco Use    Smoking status: Never    Smokeless tobacco: Never       OBJECTIVE:     Vital Signs Range (Last 24H):         Significant Labs:  No results found for: WBC, HGB, HCT, PLT, CHOL, TRIG, HDL, LDLDIRECT, ALT, AST, NA, K, CL, CREATININE, BUN, CO2, TSH, PSA, INR, GLUF, HGBA1C, MICROALBUR    Diagnostic Studies: No relevant studies.    EKG:   No results found for this or any previous visit.    2D  ECHO:  TTE:  No results found for this or any previous visit.    TARA:  No results found for this or any previous visit.    ASSESSMENT/PLAN:                                                                                                                  03/07/2023  Car Rivera is a 32 y.o., male.      Pre-op Assessment    I have reviewed the Patient Summary Reports.     I have reviewed the Nursing Notes. I have reviewed the NPO Status.   I have reviewed the Medications.     Review of Systems  Anesthesia Hx:  No previous Anesthesia  Neg history of prior surgery. Denies Family Hx of Anesthesia complications.   Denies Personal Hx of Anesthesia complications.   Cardiovascular:   Exercise tolerance: good Denies Hypertension.  Denies CAD.    Denies CABG/stent.   Denies CHF. no hyperlipidemia    Pulmonary:   Denies COPD.  Denies Asthma.  Denies Sleep Apnea.    Renal/:   Denies Chronic Renal Disease.     Hepatic/GI:   Denies GERD.    Musculoskeletal:   Lumbar radiculopathy Spine Disorders: lumbar Disc disease    Neurological:   Denies CVA. Neuromuscular Disease,   Denies Headaches. Denies Seizures.    Endocrine:   Denies Diabetes.  Denies Obesity / BMI > 30  Psych:   Denies Psychiatric History.             Anesthesia Plan  Type of Anesthesia, risks & benefits discussed:    Anesthesia Type: Gen ETT  Intra-op Monitoring Plan: Standard ASA Monitors  Post Op Pain Control Plan: multimodal analgesia and IV/PO Opioids PRN  Induction:  IV  Airway Plan: Direct and Video, Post-Induction  ASA Score: 2  Day of Surgery Review of History & Physical: H&P Update referred to the surgeon/provider.    Ready For Surgery From Anesthesia Perspective.     .

## 2023-03-08 ENCOUNTER — HOSPITAL ENCOUNTER (OUTPATIENT)
Facility: HOSPITAL | Age: 33
Discharge: HOME | End: 2023-03-08
Attending: NEUROLOGICAL SURGERY | Admitting: NEUROLOGICAL SURGERY
Payer: OTHER MISCELLANEOUS

## 2023-03-08 ENCOUNTER — ANESTHESIA (OUTPATIENT)
Dept: SURGERY | Facility: HOSPITAL | Age: 33
End: 2023-03-08
Payer: OTHER MISCELLANEOUS

## 2023-03-08 VITALS
HEIGHT: 73 IN | SYSTOLIC BLOOD PRESSURE: 137 MMHG | TEMPERATURE: 97 F | WEIGHT: 274 LBS | BODY MASS INDEX: 36.31 KG/M2 | HEART RATE: 80 BPM | RESPIRATION RATE: 15 BRPM | DIASTOLIC BLOOD PRESSURE: 84 MMHG | OXYGEN SATURATION: 97 %

## 2023-03-08 DIAGNOSIS — M51.16 LUMBAR DISC HERNIATION WITH RADICULOPATHY: ICD-10-CM

## 2023-03-08 LAB
ABO + RH BLD: NORMAL
BLD GP AB SCN CELLS X3 SERPL QL: NORMAL

## 2023-03-08 PROCEDURE — 27201423 OPTIME MED/SURG SUP & DEVICES STERILE SUPPLY: Performed by: NEUROLOGICAL SURGERY

## 2023-03-08 PROCEDURE — 86900 BLOOD TYPING SEROLOGIC ABO: CPT | Performed by: STUDENT IN AN ORGANIZED HEALTH CARE EDUCATION/TRAINING PROGRAM

## 2023-03-08 PROCEDURE — 63600175 PHARM REV CODE 636 W HCPCS

## 2023-03-08 PROCEDURE — 36000710: Performed by: NEUROLOGICAL SURGERY

## 2023-03-08 PROCEDURE — D9220A PRA ANESTHESIA: ICD-10-PCS | Mod: CRNA,,, | Performed by: REGISTERED NURSE

## 2023-03-08 PROCEDURE — 63600175 PHARM REV CODE 636 W HCPCS: Performed by: REGISTERED NURSE

## 2023-03-08 PROCEDURE — 37000008 HC ANESTHESIA 1ST 15 MINUTES: Performed by: NEUROLOGICAL SURGERY

## 2023-03-08 PROCEDURE — 25000003 PHARM REV CODE 250: Performed by: STUDENT IN AN ORGANIZED HEALTH CARE EDUCATION/TRAINING PROGRAM

## 2023-03-08 PROCEDURE — 71000016 HC POSTOP RECOV ADDL HR: Performed by: NEUROLOGICAL SURGERY

## 2023-03-08 PROCEDURE — 36415 COLL VENOUS BLD VENIPUNCTURE: CPT | Performed by: STUDENT IN AN ORGANIZED HEALTH CARE EDUCATION/TRAINING PROGRAM

## 2023-03-08 PROCEDURE — 37000009 HC ANESTHESIA EA ADD 15 MINS: Performed by: NEUROLOGICAL SURGERY

## 2023-03-08 PROCEDURE — 25000003 PHARM REV CODE 250: Performed by: NEUROLOGICAL SURGERY

## 2023-03-08 PROCEDURE — 63600175 PHARM REV CODE 636 W HCPCS: Performed by: STUDENT IN AN ORGANIZED HEALTH CARE EDUCATION/TRAINING PROGRAM

## 2023-03-08 PROCEDURE — 71000015 HC POSTOP RECOV 1ST HR: Performed by: NEUROLOGICAL SURGERY

## 2023-03-08 PROCEDURE — 71000033 HC RECOVERY, INTIAL HOUR: Performed by: NEUROLOGICAL SURGERY

## 2023-03-08 PROCEDURE — D9220A PRA ANESTHESIA: Mod: ANES,,, | Performed by: STUDENT IN AN ORGANIZED HEALTH CARE EDUCATION/TRAINING PROGRAM

## 2023-03-08 PROCEDURE — 63030 LAMOT DCMPRN NRV RT 1 LMBR: CPT | Mod: LT,,, | Performed by: NEUROLOGICAL SURGERY

## 2023-03-08 PROCEDURE — 63600175 PHARM REV CODE 636 W HCPCS: Performed by: NEUROLOGICAL SURGERY

## 2023-03-08 PROCEDURE — 71000044 HC DOSC ROUTINE RECOVERY FIRST HOUR: Performed by: NEUROLOGICAL SURGERY

## 2023-03-08 PROCEDURE — D9220A PRA ANESTHESIA: Mod: CRNA,,, | Performed by: REGISTERED NURSE

## 2023-03-08 PROCEDURE — D9220A PRA ANESTHESIA: ICD-10-PCS | Mod: ANES,,, | Performed by: STUDENT IN AN ORGANIZED HEALTH CARE EDUCATION/TRAINING PROGRAM

## 2023-03-08 PROCEDURE — 63030 PR LAMINOTOMY,LUMBAR DISK,1 INTRSP: ICD-10-PCS | Mod: LT,,, | Performed by: NEUROLOGICAL SURGERY

## 2023-03-08 PROCEDURE — 25000003 PHARM REV CODE 250

## 2023-03-08 PROCEDURE — 36000711: Performed by: NEUROLOGICAL SURGERY

## 2023-03-08 RX ORDER — ACETAMINOPHEN 10 MG/ML
INJECTION, SOLUTION INTRAVENOUS
Status: DISCONTINUED | OUTPATIENT
Start: 2023-03-08 | End: 2023-03-08

## 2023-03-08 RX ORDER — CEFAZOLIN SODIUM 1 G/3ML
INJECTION, POWDER, FOR SOLUTION INTRAMUSCULAR; INTRAVENOUS
Status: DISCONTINUED | OUTPATIENT
Start: 2023-03-08 | End: 2023-03-08

## 2023-03-08 RX ORDER — LIDOCAINE HYDROCHLORIDE 10 MG/ML
1 INJECTION, SOLUTION EPIDURAL; INFILTRATION; INTRACAUDAL; PERINEURAL ONCE AS NEEDED
Status: DISCONTINUED | OUTPATIENT
Start: 2023-03-08 | End: 2023-03-08 | Stop reason: HOSPADM

## 2023-03-08 RX ORDER — DEXMEDETOMIDINE HYDROCHLORIDE 100 UG/ML
INJECTION, SOLUTION INTRAVENOUS
Status: DISCONTINUED | OUTPATIENT
Start: 2023-03-08 | End: 2023-03-08

## 2023-03-08 RX ORDER — SUCCINYLCHOLINE CHLORIDE 20 MG/ML
INJECTION INTRAMUSCULAR; INTRAVENOUS
Status: DISCONTINUED | OUTPATIENT
Start: 2023-03-08 | End: 2023-03-08

## 2023-03-08 RX ORDER — MIDAZOLAM HYDROCHLORIDE 1 MG/ML
INJECTION, SOLUTION INTRAMUSCULAR; INTRAVENOUS
Status: DISCONTINUED | OUTPATIENT
Start: 2023-03-08 | End: 2023-03-08

## 2023-03-08 RX ORDER — SODIUM CHLORIDE 9 MG/ML
INJECTION, SOLUTION INTRAVENOUS CONTINUOUS
Status: DISCONTINUED | OUTPATIENT
Start: 2023-03-08 | End: 2023-03-08 | Stop reason: HOSPADM

## 2023-03-08 RX ORDER — KETAMINE HCL IN 0.9 % NACL 50 MG/5 ML
SYRINGE (ML) INTRAVENOUS
Status: DISCONTINUED | OUTPATIENT
Start: 2023-03-08 | End: 2023-03-08

## 2023-03-08 RX ORDER — SODIUM CHLORIDE 0.9 % (FLUSH) 0.9 %
10 SYRINGE (ML) INJECTION
Status: DISCONTINUED | OUTPATIENT
Start: 2023-03-08 | End: 2023-03-08 | Stop reason: HOSPADM

## 2023-03-08 RX ORDER — PROPOFOL 10 MG/ML
VIAL (ML) INTRAVENOUS
Status: DISCONTINUED | OUTPATIENT
Start: 2023-03-08 | End: 2023-03-08

## 2023-03-08 RX ORDER — DEXAMETHASONE SODIUM PHOSPHATE 4 MG/ML
INJECTION, SOLUTION INTRA-ARTICULAR; INTRALESIONAL; INTRAMUSCULAR; INTRAVENOUS; SOFT TISSUE
Status: DISCONTINUED | OUTPATIENT
Start: 2023-03-08 | End: 2023-03-08

## 2023-03-08 RX ORDER — HYDROMORPHONE HYDROCHLORIDE 1 MG/ML
0.2 INJECTION, SOLUTION INTRAMUSCULAR; INTRAVENOUS; SUBCUTANEOUS EVERY 5 MIN PRN
Status: DISCONTINUED | OUTPATIENT
Start: 2023-03-08 | End: 2023-03-08 | Stop reason: HOSPADM

## 2023-03-08 RX ORDER — ONDANSETRON 2 MG/ML
INJECTION INTRAMUSCULAR; INTRAVENOUS
Status: DISCONTINUED | OUTPATIENT
Start: 2023-03-08 | End: 2023-03-08

## 2023-03-08 RX ORDER — HYDROCODONE BITARTRATE AND ACETAMINOPHEN 5; 325 MG/1; MG/1
1 TABLET ORAL EVERY 6 HOURS PRN
Qty: 20 TABLET | Refills: 0 | Status: SHIPPED | OUTPATIENT
Start: 2023-03-08 | End: 2023-03-20

## 2023-03-08 RX ORDER — FENTANYL CITRATE 50 UG/ML
INJECTION, SOLUTION INTRAMUSCULAR; INTRAVENOUS
Status: DISCONTINUED | OUTPATIENT
Start: 2023-03-08 | End: 2023-03-08

## 2023-03-08 RX ORDER — HYDROCODONE BITARTRATE AND ACETAMINOPHEN 5; 325 MG/1; MG/1
1 TABLET ORAL EVERY 6 HOURS PRN
Qty: 20 TABLET | Refills: 0 | Status: SHIPPED | OUTPATIENT
Start: 2023-03-08 | End: 2023-03-20 | Stop reason: SDUPTHER

## 2023-03-08 RX ORDER — HYDROCODONE BITARTRATE AND ACETAMINOPHEN 5; 325 MG/1; MG/1
1 TABLET ORAL ONCE
Status: COMPLETED | OUTPATIENT
Start: 2023-03-08 | End: 2023-03-08

## 2023-03-08 RX ORDER — HALOPERIDOL 5 MG/ML
0.5 INJECTION INTRAMUSCULAR EVERY 10 MIN PRN
Status: DISCONTINUED | OUTPATIENT
Start: 2023-03-08 | End: 2023-03-08 | Stop reason: HOSPADM

## 2023-03-08 RX ORDER — LIDOCAINE HYDROCHLORIDE 20 MG/ML
INJECTION, SOLUTION EPIDURAL; INFILTRATION; INTRACAUDAL; PERINEURAL
Status: DISCONTINUED | OUTPATIENT
Start: 2023-03-08 | End: 2023-03-08

## 2023-03-08 RX ADMIN — REMIFENTANIL HYDROCHLORIDE 0.2 MCG/KG/MIN: 1 INJECTION, POWDER, LYOPHILIZED, FOR SOLUTION INTRAVENOUS at 11:03

## 2023-03-08 RX ADMIN — HYDROMORPHONE HYDROCHLORIDE 0.2 MG: 1 INJECTION, SOLUTION INTRAMUSCULAR; INTRAVENOUS; SUBCUTANEOUS at 04:03

## 2023-03-08 RX ADMIN — DEXMEDETOMIDINE HYDROCHLORIDE 12 MCG: 100 INJECTION, SOLUTION INTRAVENOUS at 12:03

## 2023-03-08 RX ADMIN — Medication 15 MG: at 01:03

## 2023-03-08 RX ADMIN — LIDOCAINE HYDROCHLORIDE 100 MG: 20 INJECTION, SOLUTION EPIDURAL; INFILTRATION; INTRACAUDAL at 11:03

## 2023-03-08 RX ADMIN — DEXMEDETOMIDINE HYDROCHLORIDE 12 MCG: 100 INJECTION, SOLUTION INTRAVENOUS at 03:03

## 2023-03-08 RX ADMIN — DEXMEDETOMIDINE HYDROCHLORIDE 12 MCG: 100 INJECTION, SOLUTION INTRAVENOUS at 01:03

## 2023-03-08 RX ADMIN — SUCCINYLCHOLINE CHLORIDE 120 MG: 20 INJECTION, SOLUTION INTRAMUSCULAR; INTRAVENOUS; PARENTERAL at 11:03

## 2023-03-08 RX ADMIN — FENTANYL CITRATE 50 MCG: 50 INJECTION INTRAMUSCULAR; INTRAVENOUS at 12:03

## 2023-03-08 RX ADMIN — MIDAZOLAM 2 MG: 1 INJECTION INTRAMUSCULAR; INTRAVENOUS at 11:03

## 2023-03-08 RX ADMIN — ACETAMINOPHEN 1000 MG: 10 INJECTION INTRAVENOUS at 01:03

## 2023-03-08 RX ADMIN — ONDANSETRON 4 MG: 2 INJECTION INTRAMUSCULAR; INTRAVENOUS at 03:03

## 2023-03-08 RX ADMIN — Medication 10 MG: at 02:03

## 2023-03-08 RX ADMIN — HYDROCODONE BITARTRATE AND ACETAMINOPHEN 1 TABLET: 5; 325 TABLET ORAL at 05:03

## 2023-03-08 RX ADMIN — SODIUM CHLORIDE, SODIUM GLUCONATE, SODIUM ACETATE, POTASSIUM CHLORIDE, MAGNESIUM CHLORIDE, SODIUM PHOSPHATE, DIBASIC, AND POTASSIUM PHOSPHATE: .53; .5; .37; .037; .03; .012; .00082 INJECTION, SOLUTION INTRAVENOUS at 12:03

## 2023-03-08 RX ADMIN — PROPOFOL 150 MG: 10 INJECTION, EMULSION INTRAVENOUS at 11:03

## 2023-03-08 RX ADMIN — FENTANYL CITRATE 50 MCG: 50 INJECTION INTRAMUSCULAR; INTRAVENOUS at 02:03

## 2023-03-08 RX ADMIN — FENTANYL CITRATE 100 MCG: 50 INJECTION INTRAMUSCULAR; INTRAVENOUS at 11:03

## 2023-03-08 RX ADMIN — SODIUM CHLORIDE: 0.9 INJECTION, SOLUTION INTRAVENOUS at 11:03

## 2023-03-08 RX ADMIN — PROPOFOL 50 MG: 10 INJECTION, EMULSION INTRAVENOUS at 12:03

## 2023-03-08 RX ADMIN — Medication 25 MG: at 11:03

## 2023-03-08 RX ADMIN — DEXMEDETOMIDINE HYDROCHLORIDE 12 MCG: 100 INJECTION, SOLUTION INTRAVENOUS at 02:03

## 2023-03-08 RX ADMIN — PROPOFOL 150 MCG/KG/MIN: 10 INJECTION, EMULSION INTRAVENOUS at 11:03

## 2023-03-08 RX ADMIN — CEFAZOLIN 2 G: 1 INJECTION, POWDER, FOR SOLUTION INTRAMUSCULAR; INTRAVENOUS at 12:03

## 2023-03-08 RX ADMIN — CEFAZOLIN 2 G: 2 INJECTION, POWDER, FOR SOLUTION INTRAMUSCULAR; INTRAVENOUS at 12:03

## 2023-03-08 RX ADMIN — DEXAMETHASONE SODIUM PHOSPHATE 8 MG: 4 INJECTION INTRA-ARTICULAR; INTRALESIONAL; INTRAMUSCULAR; INTRAVENOUS; SOFT TISSUE at 12:03

## 2023-03-08 NOTE — ANESTHESIA PROCEDURE NOTES
Intubation    Date/Time: 3/8/2023 12:01 PM  Performed by: Yimi Lemos MD  Authorized by: Meli Butcher MD     Intubation:     Induction:  Intravenous    Intubated:  Postinduction    Mask Ventilation:  Not attempted    Attempts:  1    Attempted By:  Resident anesthesiologist    Method of Intubation:  Direct    Blade:  Strange 2    Laryngeal View Grade: Grade IIA - cords partially seen      Difficult Airway Encountered?: No      Complications:  None    Airway Device:  Oral endotracheal tube    Airway Device Size:  7.5    Style/Cuff Inflation:  Cuffed (inflated to minimal occlusive pressure)    Tube secured:  22    Secured at:  The lips    Placement Verified By:  Capnometry    Complicating Factors:  Anterior larynx    Findings Post-Intubation:  BS equal bilateral and atraumatic/condition of teeth unchanged

## 2023-03-08 NOTE — H&P
Please see below clinic note from Dr. Whitfield clinic. Agree with assessments and plans. Proceed to OR    Michel OCAMPOGY PGY2                                                                                                                                                                                                      Neurosurgery  History & Physical     SUBJECTIVE:      Chief Complaint:  Lumbar radiculopathy     History of Present Illness:  History of Present Illness: Car Rivera is a 32 y.o. male being seen in clinic today to discuss concerns with lumbar radiculopathy. 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 He has also been taking naproxen and Zanaflex with mild relief. States that his symptoms have improved slightly since the initial injury. Rates his pain today as a 7/10. Describes the pain as constant and aching on the left-side of the low back with radiation down the posterior and lateral aspect of the left leg associated with numbness and tingling. Aggravating factors include standing and walking. Alleviating factors include frequent position changes and medications. Denies weakness, b/b dysfunction, saddle anesthesia, or gait instability. He is scheduled to return to work next week.        Interval Hx 1/17/23: After the last appointment, the patient was apprehensive about surgery so injections were ordered. He obtained the L5/S1 REINA on 11/14/22. States that he obtained about 3 weeks of relief with the injection; however, he continued to work and lately the pain has worsened affecting his ability to work. He would like to discuss surgical options. The pain continues to radiate down the posterior and lateral aspect of the left leg. Denies right leg symptoms. He also obtained 2 additional PT sessions without relief. Denies weakness, b/b  "dysfunction, saddle anesthesia, or gait instability     Interval history 01/23/2023:  Patient underwent L5/S1 epidural steroid injections on 11/14/2022.  He previously had some relief.  He then had worsening affecting his ability to work.  He would like to discuss surgical options at this particular time.  Continues to have pain that radiates down the posterior and lateral aspect of the left leg.  Denies any right leg symptoms.  Denies any debra weakness, bowel or bladder dysfunction, saddle anesthesia, gait instability.     Review of patient's allergies indicates:  No Known Allergies     Current Medications          Current Outpatient Medications   Medication Sig Dispense Refill    ibuprofen (ADVIL,MOTRIN) 200 MG tablet Take 200 mg by mouth every 6 (six) hours as needed for Pain.        naproxen (NAPROSYN) 500 MG tablet Take 1 tablet (500 mg total) by mouth 2 (two) times daily with meals. (Patient not taking: Reported on 1/17/2023) 30 tablet 0      No current facility-administered medications for this visit.            History reviewed. No pertinent past medical history.        Past Surgical History:   Procedure Laterality Date    EPIDURAL STEROID INJECTION N/A 11/14/2022     Procedure: LUMBAR L5/S1 REINA CONTRAST DIRECT REFERRAL;  Surgeon: Dustin Silva MD;  [facility];  Service: Pain Management;  Laterality: N/A;      Family History    None         Social History           Socioeconomic History    Marital status:    Tobacco Use    Smoking status: Never    Smokeless tobacco: Never         Review of Systems     OBJECTIVE:      Vital Signs  Pulse: (!) 120  BP: (!) 154/106  Pain Score:   7  Height: 6' 1" (185.4 cm)  Weight: 124.3 kg (274 lb)  Body mass index is 36.15 kg/m².        Neurosurgery Physical Exam  General: well developed, well nourished, no distress.   Head: normocephalic, atraumatic  Neurologic: Alert and oriented. Thought content appropriate.  GCS: Motor: 6/Verbal: 5/Eyes: 4 GCS Total: " 15  Mental Status: Awake, Alert, Oriented x 4  Language: No aphasia  Speech: No dysarthria  Cranial nerves: face symmetric, tongue midline, CN II-XII grossly intact.   Eyes: pupils equal, round, reactive to light with accomodation, EOMI.   Pulmonary: normal respirations, no signs of respiratory distress  Abdomen: soft, non-distended  Skin: Skin is warm, dry and intact.  Sensory: intact to light touch throughout  Motor Strength:Moves all extremities spontaneously with good tone.    Strength   Deltoids Triceps Biceps Wrist Extension Wrist Flexion Hand    Upper: R 5/5 5/5 5/5 5/5 5/5 5/5     L 5/5 5/5 5/5 5/5 5/5 5/5       HF KE KF DF PF EHL   Lower: R 5/5 5/5 5/5 5/5 5/5 5/5     L 5/5 5/5 5/5 5/5 5/5 5/5         Cerebellar:   Gait stable, antalgic     Cervical:   ROM: Full with flexion, extension, lateral rotation and ear-to-shoulder bend.   Midline TTP: Negative.     Thoracic:  Midline TTP: Negative.     Lumbar:  Midline TTP: Positive  Straight Leg Test: Positive LEFT     Other:  SI joint TTP: Negative.  Greater trochanter TTP: Negative.  Tenderness with external/internal hip rotation: Negative.           Diagnostic Results:  I personally reviewed patient's Diagnostic Imaging.  Previous MRI of the lumbar spine 09/30/2022 with somewhat straightening of the lumbar spine, disc protrusion at L4/L5, L5/S1.  Moderate canal stenosis with impingement upon the descending nerve root appreciated at L5/S1 on the left    ASSESSMENT/PLAN:      32-year-old man with L5/S1 radiculopathy, refractory toward conservative treatment.      1. No focal deficits on examination in clinic   2. MRI of the lumbar spine with L4/L5 disc protrusion, L5/S1 disc protrusion.  Laterality of the disc at L5/S1 on the left.  No focal segmental instability on flexion-extension plain films.  Relative loss of lumbar lordosis.  3. Had long discussion with the patient.  Given his persistent left S1 radicular symptoms, with left L5/S1 disc protrusion, and  persistence despite physical therapy, and epidural steroid injections.  I do recommend decompression with a microdiskectomy.  I discussed with the patient the risks, benefits, and alternatives to the procedure including not limited to heart attack coma, stroke, infection, bleeding, paralysis, even death.  I discussed the risk of possible worsening of current status, no change in clinical exam, or improvement.  The patient understood the risks, benefits, and alternatives to the procedure and elected to proceed after informed consent was obtained and secured in the patient's chart.    I answered all the patient's questions to his satisfaction.  Thank you so very much for allowing me to participate in the care of this patient.  Please feel free to call any questions, comments, or concerns.    Jessica Whitfield MD,MSc  Department of Neurosurgery   Department of Radiology  Department of Neurology  [facility]    [facility]   [employer] [facility] [employer]    Total time spent in counseling and discussion about further management options including relevant lab work, treatment,  prognosis, medications and intended side effects was more than 60 minutes. More than 50 % of the time was spent in counseling and coordination of care.  I spent a total of 60 minutes on the day of the visit.This includes face to face time and non-face to face time preparing to see the patient (eg, review of tests), Obtaining and/or reviewing separately obtained history, Documenting clinical information in the electronic or other health record, Independently interpreting resultsand communicating results to the patient/family/caregiver, or Care coordination              Note dictated with voice recognition software, please excuse any grammatical errors.

## 2023-03-08 NOTE — OP NOTE
Minimally Invasive: Lumbar Laminectomy and Discectomy     DATE OF SURGERY:  03/08/2023    SURGEON: Jessica Whitfield MD,MSc  ASSISTANT:Michel Dominguez MD     PREOPERATIVE DIAGNOSIS: Symptomatic lumbar stenosis, lumbar radiculopathy, disc herniation L5/S1  POSTOPERATIVE DIAGNOSIS: Symptomatic lumbar stenosis, lumbar radiculopathy, disc herniation L5/S1    PROCEDURE PERFORMED:   1. MIS L5 hemilaminectomy decompression of neural elements, foraminotomy and microdiscectomy L5/S1.  2. Intraoperative use of microscope for microdissection.    CPT   72132 Laminotomy (hemilaminectomy), with decompression of nerve root(s), including partial facetectomy, foraminotomy and/or excision of herniated intervertebral disc; one interspace, lumbar (including open or endoscopically-assisted approach)  58323 Microsurgical techniques, requiring use of operating microscope   20117 Fluoroscopic guidance for needle placement     ESTIMATED BLOOD LOSS:  Minimal  ANESTHESIA: GETA.  FINDINGS:  L5/S1 extruded disc fragment .  DRAINS:  None.  COMPLICATIONS: None.  DISPOSITION: Stable to the PACU.     INDICATIONS FOR THE PROCEDURE   Patient is a 32 year old male who presents with signs, symptoms and radiographic evidence of minimal back pain, primarily left S1 radicular symptoms, intermittent weakness.  And progressively worsening pain which had been refractory toward conservative treatment with over-the-counter analgesics, epidural steroid injections, and physical therapy.  MRI of the lumbar spine showed L5/S1 disc protrusion with lateralization to the left compressing on the descending S1 nerve root..  Given the progression of the symptoms, the patient decided to proceed with laminectomy and decompression of the L5/S1 nerve roots    The patient and family were well apprised of all objectives, benefits, risks and potential complications of the procedure, including but not limited to: worsening of current status, the possible need for further  procedures, the risk of infection, headaches, CSF leak, possible spinal nerve injury resulting in paralysis, infection, injury to major vessels causing hemorrhage, stroke, loss of language function, coma and even death. No assurance was given whether symptoms would improve following the procedure. Informed consent was obtained and secured in the chart after the patient and family voiced understanding of these risks and decided to proceed with the operation.     DESCRIPTION OF THE PROCEDURE  The patient was transferred to the operating room.  He was given preoperative prophylactic IV antibiotics.    The patient was sedated and intubated without difficulty by the anesthesia service. Eyes were taped shut after ointment was applied to prevent corneal abrasion. A Vicente Hugger was placed over the upper body to maintain control of core body temperature. The neurophysiological monitoring team placed their leads in the appropriate position.     The patient was turned prone on Marcellus frame on Jose table. All pressure points were carefully padded.   The patient was prepped and draped in the standard sterile fashion. The C-arm fluoroscopy was draped and brought in to the operative field and the L5/S1 disc space was positively identified. The skin was infiltrated with 1% Marcaine with epinephrine. A 18mm paramedian incision was performed 1.5cm from midline. This was carried down through the fascial plane at the level of the musculature. A solid core dilator was docked at the L5/S1 interspace using intraoperative fluoro-scopy. Utilizing serial dilators, a minimally invasive, muscle-splitting approach was utilized to reach the lamina. A self-retaining table mounted retractor was then inserted.The appropriately sized dilator was placed. And the microscope was brought in.     The remaining bits of muscle were removed utilizing a combination of pituitary rongeurs and monopolar and bipolar electrocautery. Hemostasis was meticulously  achieved. The space was again confirmed utilizing fluoroscopy x-ray. Utilizing a high-speed pneumatic drill with a match-head nicola, the L5 inferior lamina was drilled out preserving the facet joint. We then moved to the superior edge of the lamina which was cleaned utilizing Bovie electrocautery and the superior edge of the lamina was identified. Again utilizing the high-speed pneumatic drill, the superior edge of the lamina was partially drilled out. The ligamentum flavum was then easily excised with Kerrison ronguers. The dura was retracted medially and the L5/S1 disc bulge was noted. Disc material was easily expressed once an incision had been performed through the annulus. A small pituitary rongeur was used to remove all loose disc fragments that were obtainable from within the disc space. The root was completely decompressed. The wound was copiously irrigated. Depo medrol was placed over the nerve root The fascia was subsequently closed utilizing interrupted 0 polyglactin synthetic absorbable suture (Vicryl). Local anesthetic was given around the area and the subcutaneous tissue was approximated with (X)-0 poly-glactin synthetic absorbable suture (Vicryl, after the wound had been copiously irrigated with antibiotic saline irrigation. The skin was then approximated with Monocryl suture and dermabond. No drain was used. The incision was dressed in a clean dry dressing.    All sponge counts, needle counts and instrument counts were correct at the end of the case times two. The neurophysiological monitoring was unchanged from baseline. The patient tolerated the procedure well, without any complications and was transferred in stable condition to the recovery room. Dr. Whitfield was present during the entirety of this case.

## 2023-03-08 NOTE — BRIEF OP NOTE
[facility] - Surgery ([facility])  Brief Operative Note    Surgery Date: 3/8/2023     Surgeon(s) and Role:     * Jessica Whitfield MD - Primary    Assisting Surgeon: None    Pre-op Diagnosis:  Lumbar radiculopathy [M54.16]    Post-op Diagnosis:  Post-Op Diagnosis Codes:     * Lumbar radiculopathy [M54.16]    Procedure(s) (LRB):  DISCECTOMY, SPINE, MINIMALLY INVASIVE, USING MICROSCOPE (Left)    Anesthesia: General    Operative Findings: left L5-S1 microdiscecomty    Estimated Blood Loss: * No values recorded between 3/8/2023 12:43 PM and 3/8/2023  3:31 PM *         Specimens:   Specimen (24h ago, onward)      None              Discharge Note    OUTCOME: Patient tolerated treatment/procedure well without complication and is now ready for discharge.    DISPOSITION: Home or Self Care    FINAL DIAGNOSIS:  Lumbar disc herniation with radiculopathy    FOLLOWUP: In clinic    DISCHARGE INSTRUCTIONS:     --Patient stable for discharge to [facility]    --Please take prescriptions as detailed in medication list    --All questions/concerns addressed and answered    --Please followup with neurosurgery clinic in 2 weeks for wound check, to be arranged by Neurosurgery Clinic     --Please call immediately for any new onset nausea/vomiting/fever/chills, wound breakdown, numbness/tingling/weakness    Wound Care Instructions:  -If you have any dressings at discharge, please remove 48 hours after the surgery.  -If you have steri strips, do not remove, as they will fall off.  -If you have staples, do not remove, as they will be removed at clinic follow up.  -You may shower daily but do not soak or submerge wound in water. Pat incision dry, do not rub.  -Keep all incisions clean, dry, and open to air.  -Do not apply creams or ointments to the wound.  -No driving while on narcotic pain medications  -If you were given a brace for spine surgery, please remove to shower, may remove while in bed, no driving, and must be worn for 6 weeks when out of  bed.  -Call Neurosurgery if the wound opens, drains, or becomes red.

## 2023-03-08 NOTE — ANESTHESIA POSTPROCEDURE EVALUATION
Anesthesia Post Evaluation    Patient: Car Rivera    Procedure(s) Performed: Procedure(s) (LRB):  DISCECTOMY, SPINE, MINIMALLY INVASIVE, USING MICROSCOPE (Left)    Final Anesthesia Type: general      Patient location during evaluation: PACU  Patient participation: Yes- Able to Participate  Level of consciousness: awake and alert  Post-procedure vital signs: reviewed and stable  Pain management: adequate  Airway patency: patent  LEROY mitigation strategies: Multimodal analgesia  PONV status at discharge: No PONV  Anesthetic complications: no      Cardiovascular status: blood pressure returned to baseline and hemodynamically stable  Respiratory status: unassisted  Hydration status: euvolemic  Follow-up not needed.          Vitals Value Taken Time   /88 03/08/23 1602   Temp 36.3 °C (97.3 °F) 03/08/23 1544   Pulse 89 03/08/23 1604   Resp 20 03/08/23 1604   SpO2 98 % 03/08/23 1604   Vitals shown include unvalidated device data.      No case tracking events are documented in the log.      Pain/Luis Alberto Score: No data recorded

## 2023-03-08 NOTE — TRANSFER OF CARE
"Anesthesia Transfer of Care Note    Patient: Car Rivera    Procedure(s) Performed: Procedure(s) (LRB):  DISCECTOMY, SPINE, MINIMALLY INVASIVE, USING MICROSCOPE (Left)    Patient location: [facility]    Anesthesia Type: general    Transport from OR: Transported from OR on 6-10 L/min O2 by face mask with adequate spontaneous ventilation    Post pain: adequate analgesia    Post assessment: no apparent anesthetic complications and tolerated procedure well    Post vital signs: stable    Level of consciousness: awake and alert    Nausea/Vomiting: no nausea/vomiting    Complications: none    Transfer of care protocol was followedComments: Nurse at bedside, VSS, spont reg resp noted      Last vitals:   Visit Vitals  /89 (BP Location: Left arm, Patient Position: Lying)   Pulse 94   Temp 36.3 °C (97.3 °F) (Temporal)   Resp 18   Ht 6' 1" (1.854 m)   Wt 124.3 kg (274 lb)   SpO2 96%   BMI 36.15 kg/m²     "

## 2023-03-20 DIAGNOSIS — M51.16 LUMBAR DISC HERNIATION WITH RADICULOPATHY: Primary | ICD-10-CM

## 2023-03-20 RX ORDER — TIZANIDINE 4 MG/1
4 TABLET ORAL EVERY 8 HOURS PRN
Qty: 30 TABLET | Refills: 0 | Status: SHIPPED | OUTPATIENT
Start: 2023-03-20 | End: 2023-03-30

## 2023-03-22 ENCOUNTER — CLINICAL SUPPORT (OUTPATIENT)
Dept: NEUROSURGERY | Facility: CLINIC | Age: 33
End: 2023-03-22

## 2023-03-22 NOTE — PROGRESS NOTES
Car Rivera is a 32 y.o. male here for 2 week post op wound check.     Surgery:laminectomy    Symptoms: none    DME:none    Brace: none    Pain: none    Medication Refills: none       Incision with dermabond IGOR, well approximated, no redness, swelling or purulent drainage. Instructed patient to keep incision IGOR, no lotions, creams or bandages. OK to shower without water pressure to area. PostOP written instructions reviewed verbally with  patient.     Patient verbalizes understanding. Patient to followup with Dr. Whitfield for 6 week followup with imaging.    Future Appointments   Date Time Provider Department Center   4/25/2023 10:00 AM Jessica Whitfield MD [facility] NEUROS8 [facility]

## 2023-03-30 ENCOUNTER — PATIENT MESSAGE (OUTPATIENT)
Dept: NEUROSURGERY | Facility: CLINIC | Age: 33
End: 2023-03-30
Payer: OTHER MISCELLANEOUS

## 2023-04-03 ENCOUNTER — PATIENT MESSAGE (OUTPATIENT)
Dept: NEUROSURGERY | Facility: CLINIC | Age: 33
End: 2023-04-03
Payer: OTHER MISCELLANEOUS

## 2023-04-03 DIAGNOSIS — M51.16 LUMBAR DISC HERNIATION WITH RADICULOPATHY: Primary | ICD-10-CM

## 2023-04-03 RX ORDER — CYCLOBENZAPRINE HCL 5 MG
5 TABLET ORAL 3 TIMES DAILY PRN
Qty: 40 TABLET | Refills: 0 | Status: SHIPPED | OUTPATIENT
Start: 2023-04-03 | End: 2023-04-18 | Stop reason: SDUPTHER

## 2023-04-05 ENCOUNTER — PATIENT MESSAGE (OUTPATIENT)
Dept: ADMINISTRATIVE | Facility: OTHER | Age: 33
End: 2023-04-05
Payer: OTHER MISCELLANEOUS

## 2023-04-09 ENCOUNTER — PATIENT MESSAGE (OUTPATIENT)
Dept: ADMINISTRATIVE | Facility: OTHER | Age: 33
End: 2023-04-09
Payer: OTHER MISCELLANEOUS

## 2023-04-18 ENCOUNTER — PATIENT MESSAGE (OUTPATIENT)
Dept: NEUROSURGERY | Facility: CLINIC | Age: 33
End: 2023-04-18
Payer: OTHER MISCELLANEOUS

## 2023-04-24 ENCOUNTER — PATIENT MESSAGE (OUTPATIENT)
Dept: NEUROSURGERY | Facility: CLINIC | Age: 33
End: 2023-04-24
Payer: OTHER MISCELLANEOUS

## 2023-04-25 ENCOUNTER — OFFICE VISIT (OUTPATIENT)
Dept: NEUROSURGERY | Facility: CLINIC | Age: 33
End: 2023-04-25

## 2023-04-25 ENCOUNTER — TELEPHONE (OUTPATIENT)
Dept: NEUROSURGERY | Facility: CLINIC | Age: 33
End: 2023-04-25
Payer: OTHER MISCELLANEOUS

## 2023-04-25 ENCOUNTER — PATIENT MESSAGE (OUTPATIENT)
Dept: NEUROSURGERY | Facility: CLINIC | Age: 33
End: 2023-04-25
Payer: OTHER MISCELLANEOUS

## 2023-04-25 DIAGNOSIS — M51.16 LUMBAR DISC HERNIATION WITH RADICULOPATHY: Primary | ICD-10-CM

## 2023-04-25 DIAGNOSIS — M79.605 PAIN IN LEFT LEG: ICD-10-CM

## 2023-04-25 DIAGNOSIS — M54.42 ACUTE MIDLINE LOW BACK PAIN WITH LEFT-SIDED SCIATICA: ICD-10-CM

## 2023-04-25 PROCEDURE — 99024 POSTOP FOLLOW-UP VISIT: CPT | Mod: ,,, | Performed by: NEUROLOGICAL SURGERY

## 2023-04-25 PROCEDURE — 99024 PR POST-OP FOLLOW-UP VISIT: ICD-10-PCS | Mod: ,,, | Performed by: NEUROLOGICAL SURGERY

## 2023-04-25 NOTE — PROGRESS NOTES
Neurosurgery  Established Patient    SUBJECTIVE:     History of Present Illness:  Patient is a 32-year-old man who previously presented with lumbar stenosis and disc at L5/S1 he is noted to have S1 radiculopathy and associated back pain.  She underwent multiple epidural steroid injections with some to minimal relief.  He continued to have painful radicular symptoms.  He underwent preoperative flexion-extension imaging without any gross motor instability.  But continued to have some axial back pain, hip pain as well as radicular symptoms.  He underwent MIS L5/S1 microdiskectomy.  He is here now for follow-up.  He denies any new radicular symptoms.  He notes that his radicular pain has largely resolved.  He still continues to have some axial back pain specifically with long walking.  He notes that he is feels like he is out of shape.  And that he could use some physical therapy.  He denies any new paresthesias in the bowel or bladder incontinence or any debra weakness in the lower extremities.  This was a virtual audio visual visit.  He denies any chest pain, shortness of breath, fever chills, nausea vomiting.  He notes his incision is very well healed without any tenderness to palpation and no redness.    Review of patient's allergies indicates:  No Known Allergies    Current Outpatient Medications   Medication Sig Dispense Refill    cyclobenzaprine (FLEXERIL) 5 MG tablet Take 1 tablet (5 mg total) by mouth 3 (three) times daily as needed for Muscle spasms. 40 tablet 0    gabapentin (NEURONTIN) 300 MG capsule Take 1 capsule (300 mg total) by mouth 3 (three) times daily. 90 capsule 11    HYDROcodone-acetaminophen (NORCO) 5-325 mg per tablet Take 1 tablet by mouth every 6 (six) hours as needed for Pain. 20 tablet 0    ibuprofen (ADVIL,MOTRIN) 200 MG tablet Take 200 mg by mouth every 6 (six) hours as needed for Pain.      naproxen (NAPROSYN) 500 MG tablet Take 1 tablet (500 mg total) by mouth 2 (two) times daily with  meals. (Patient not taking: Reported on 3/22/2023) 30 tablet 0     No current facility-administered medications for this visit.       Past Medical History:   Diagnosis Date    Lumbar disc herniation with radiculopathy      Past Surgical History:   Procedure Laterality Date    EPIDURAL STEROID INJECTION N/A 11/14/2022    Procedure: LUMBAR L5/S1 REINA CONTRAST DIRECT REFERRAL;  Surgeon: Dustin Silva MD;  Location: [facility];  Service: Pain Management;  Laterality: N/A;    MINIMALLY INVASIVE SURGICAL REMOVAL OF INTERVERTEBRAL DISC OF SPINE USING MICROSCOPE Left 3/8/2023    Procedure: DISCECTOMY, SPINE, MINIMALLY INVASIVE, USING MICROSCOPE;  Surgeon: Jessica Whitfield MD;  Location: [facility] OR [address] [place] [address];  Service: Neurosurgery;  Laterality: Left;  Lumbar laminectomy lt, laminotomy with decompression of nerve roots, laminectomy w / facetotomy and or foraminotomy (for stenosis), MIS L5/S/lumbar laminectomy and discectomy At left, neuromonitoring EMG/SEP, bed robin frame     Family History    None       Social History     Socioeconomic History    Marital status: Other   Tobacco Use    Smoking status: Never    Smokeless tobacco: Never       Review of Systems    OBJECTIVE:     Vital Signs     There is no height or weight on file to calculate BMI.    Neurosurgery Physical Exam  On virtual audio visual visit   Head is normocephalic atraumatic   Neck is grossly supple  No appreciable labored breathing   Admitting appears to be nontender   Patient is able to move extremities     On virtual audio visual visit the patient's speech is fluent, goal directed without any noted dysarthria or aphasia   Unable to evaluate pupils on virtual audio visual visit   Face is symmetric   Tongue is midline  Unable to evaluate palate   Hearing appears to be intact on virtual audio visual visit to voice   Patient able to move both upper and lower extremities without any gross motor asymmetry on virtual audio visual visit     Diagnostic Results:  No  new imaging to review    ASSESSMENT/PLAN:     32-year-old man with S1 radiculopathy, status post L5-S1 diskectomy     1. No significant clinical change on virtual audio visual visit.      2. No new imaging to review     3. Patient notes his radicular symptoms have resolved he continues to have hip and axial back pain.  Would recommend physical therapy to continues rehabilitation, as well as strengthening his lower back.  Patient agreed and follow-up at his request.  Answered all the patient's questions to his satisfaction.      Thank you so very much for allowing me to participate in the care of this patient.  Please feel free to call any questions, comments, or concerns.    Jessica Whitfield MD,MSc  Department of Neurosurgery   Department of Radiology  Department of Neurology  [facility]    [facility]   [employer]    Virtual visit Attestation   The patient location is: Home  The chief complaint leading to consultation is:  Lumbar radiculopathy  Visit type: audiovisual  Total time spent with patient: 30 min     Each patient to whom he or she provides medical services by telemedicine is:  (1) informed of the relationship between the physician and patient and the respective role of any other health care provider with respect to management of the patient; and (2) notified that he or she may decline to receive medical services by telemedicine and may withdraw from such care at any time.         Note dictated with voice recognition software, please excuse any grammatical errors.

## 2023-04-27 ENCOUNTER — TELEPHONE (OUTPATIENT)
Dept: NEUROSURGERY | Facility: CLINIC | Age: 33
End: 2023-04-27
Payer: OTHER MISCELLANEOUS

## 2023-05-02 ENCOUNTER — PATIENT MESSAGE (OUTPATIENT)
Dept: NEUROSURGERY | Facility: CLINIC | Age: 33
End: 2023-05-02
Payer: OTHER MISCELLANEOUS

## 2023-05-02 DIAGNOSIS — M51.16 LUMBAR DISC HERNIATION WITH RADICULOPATHY: ICD-10-CM

## 2023-05-02 DIAGNOSIS — M48.061 SPINAL STENOSIS OF LUMBAR REGION WITHOUT NEUROGENIC CLAUDICATION: Primary | ICD-10-CM

## 2023-05-02 RX ORDER — TRAMADOL HYDROCHLORIDE 50 MG/1
50 TABLET ORAL EVERY 6 HOURS PRN
Qty: 28 TABLET | Refills: 0 | Status: SHIPPED | OUTPATIENT
Start: 2023-05-02

## 2023-05-10 ENCOUNTER — PATIENT MESSAGE (OUTPATIENT)
Dept: NEUROSURGERY | Facility: CLINIC | Age: 33
End: 2023-05-10
Payer: OTHER MISCELLANEOUS

## 2023-05-31 ENCOUNTER — TELEPHONE (OUTPATIENT)
Dept: NEUROSURGERY | Facility: CLINIC | Age: 33
End: 2023-05-31
Payer: OTHER MISCELLANEOUS

## 2023-05-31 NOTE — TELEPHONE ENCOUNTER
----- Message from Giuliana Clemente sent at 5/31/2023  3:39 PM CDT -----  Regarding: pt advice  Contact: [phone]  Maddi calling from  [facility] in regards to confirming a plan of care  for pt that was faxed over. Pls signed and fax back over.  Pls call          FAX  [phone]

## 2023-06-05 ENCOUNTER — PATIENT MESSAGE (OUTPATIENT)
Dept: NEUROSURGERY | Facility: CLINIC | Age: 33
End: 2023-06-05
Payer: OTHER MISCELLANEOUS

## 2023-06-28 ENCOUNTER — PATIENT MESSAGE (OUTPATIENT)
Dept: NEUROSURGERY | Facility: CLINIC | Age: 33
End: 2023-06-28
Payer: OTHER MISCELLANEOUS

## 2023-06-30 ENCOUNTER — PATIENT MESSAGE (OUTPATIENT)
Dept: NEUROSURGERY | Facility: CLINIC | Age: 33
End: 2023-06-30
Payer: OTHER MISCELLANEOUS

## 2023-09-29 ENCOUNTER — PATIENT MESSAGE (OUTPATIENT)
Dept: NEUROSURGERY | Facility: CLINIC | Age: 33
End: 2023-09-29
Payer: OTHER MISCELLANEOUS

## 2023-10-03 ENCOUNTER — OFFICE VISIT (OUTPATIENT)
Dept: NEUROSURGERY | Facility: CLINIC | Age: 33
End: 2023-10-03
Payer: OTHER MISCELLANEOUS

## 2023-10-03 VITALS
HEIGHT: 73 IN | BODY MASS INDEX: 36.31 KG/M2 | HEART RATE: 106 BPM | SYSTOLIC BLOOD PRESSURE: 145 MMHG | DIASTOLIC BLOOD PRESSURE: 84 MMHG | WEIGHT: 274 LBS

## 2023-10-03 DIAGNOSIS — M51.16 LUMBAR DISC HERNIATION WITH RADICULOPATHY: ICD-10-CM

## 2023-10-03 DIAGNOSIS — M79.605 PAIN IN LEFT LEG: ICD-10-CM

## 2023-10-03 DIAGNOSIS — M48.061 SPINAL STENOSIS OF LUMBAR REGION WITHOUT NEUROGENIC CLAUDICATION: Primary | ICD-10-CM

## 2023-10-03 PROCEDURE — 99999 PR PBB SHADOW E&M-EST. PATIENT-LVL III: CPT | Mod: PBBFAC,,, | Performed by: NEUROLOGICAL SURGERY

## 2023-10-03 PROCEDURE — 99213 PR OFFICE/OUTPT VISIT, EST, LEVL III, 20-29 MIN: ICD-10-PCS | Mod: S$GLB,,, | Performed by: NEUROLOGICAL SURGERY

## 2023-10-03 PROCEDURE — 99999 PR PBB SHADOW E&M-EST. PATIENT-LVL III: ICD-10-PCS | Mod: PBBFAC,,, | Performed by: NEUROLOGICAL SURGERY

## 2023-10-03 PROCEDURE — 99213 OFFICE O/P EST LOW 20 MIN: CPT | Mod: S$GLB,,, | Performed by: NEUROLOGICAL SURGERY

## 2023-10-03 NOTE — PROGRESS NOTES
Neurosurgery  Established Patient    SUBJECTIVE:     History of Present Illness:  Patient is a 32-year-old man who previously presented with lumbar stenosis and disc at L5/S1 he is noted to have S1 radiculopathy and associated back pain.  She underwent multiple epidural steroid injections with some to minimal relief.  He continued to have painful radicular symptoms.  He underwent preoperative flexion-extension imaging without any gross motor instability.  But continued to have some axial back pain, hip pain as well as radicular symptoms.    He underwent MIS L5/S1 microdiskectomy.  He is here now for follow-up.  He denies any new radicular symptoms.  He notes that his radicular pain has largely resolved.  He still continues to have some axial back pain specifically with long walking.  He notes that he is feels like he is out of shape.  And that he could use some physical therapy.  He denies any new paresthesias in the bowel or bladder incontinence or any debra weakness in the lower extremities.  This was a virtual audio visual visit.  He denies any chest pain, shortness of breath, fever chills, nausea vomiting.  He notes his incision is very well healed without any tenderness to palpation and no redness.    Interval history 10/03/2023:   Patient returns for released from worker's compensation.  He denies any new paresthesias than lower extremity.  He denies any worsening axial back pain.  His wound is clean dry and intact.  He denies any difficulty with gait.  He does note after he walks for about 30 minutes sometimes he does get tingling in his leg which resolved after about 30 seconds of rest.  He notes that the nondermatomal paresthesias in the leg are treated quite well with ibuprofen.  Currently he is taking upwards of 1600 mg daily.  He is not found any alternative relief in Tylenol, Naprosyn, or aspirin.  He denies any saddle anesthesia, bowel or bladder incontinence.    Review of patient's allergies  indicates:  No Known Allergies    Current Outpatient Medications   Medication Sig Dispense Refill    gabapentin (NEURONTIN) 300 MG capsule Take 1 capsule (300 mg total) by mouth 3 (three) times daily. 90 capsule 11    ibuprofen (ADVIL,MOTRIN) 200 MG tablet Take 200 mg by mouth every 6 (six) hours as needed for Pain.      naproxen (NAPROSYN) 500 MG tablet Take 1 tablet (500 mg total) by mouth 2 (two) times daily with meals. (Patient not taking: Reported on 3/22/2023) 30 tablet 0    traMADoL (ULTRAM) 50 mg tablet Take 1 tablet (50 mg total) by mouth every 6 (six) hours as needed for Pain. 28 tablet 0     No current facility-administered medications for this visit.       Past Medical History:   Diagnosis Date    Lumbar disc herniation with radiculopathy      Past Surgical History:   Procedure Laterality Date    EPIDURAL STEROID INJECTION N/A 11/14/2022    Procedure: LUMBAR L5/S1 REINA CONTRAST DIRECT REFERRAL;  Surgeon: Dustin Silva MD;  Location: [facility];  Service: Pain Management;  Laterality: N/A;    MINIMALLY INVASIVE SURGICAL REMOVAL OF INTERVERTEBRAL DISC OF SPINE USING MICROSCOPE Left 3/8/2023    Procedure: DISCECTOMY, SPINE, MINIMALLY INVASIVE, USING MICROSCOPE;  Surgeon: Jessica Whitfield MD;  Location: [facility] OR [address] [place] [address];  Service: Neurosurgery;  Laterality: Left;  Lumbar laminectomy lt, laminotomy with decompression of nerve roots, laminectomy w / facetotomy and or foraminotomy (for stenosis), MIS L5/S/lumbar laminectomy and discectomy At left, neuromonitoring EMG/SEP, bed robin frame     Family History    None       Social History     Socioeconomic History    Marital status: Other   Tobacco Use    Smoking status: Never    Smokeless tobacco: Never       Review of Systems    OBJECTIVE:     Vital Signs     There is no height or weight on file to calculate BMI.    Neurosurgery Physical Exam  General: no acute distress  Head: Non-traumatic, normocephalic  Eyes: Pupils equal, EOMI  Neck: Supple, normal ROM,  no tenderness to palpation  CVS: Normal rate and rhythm, distal pulses present  Pulm: Symmetric expansion, no respiratory distress  GI: Abdomen nondistended, nontender    MSK: Moves all extremities without restriction, atraumatic  Skin: Dry, intact  Psych: Normal thought content and cognition    Neuro:  Alert, awake, oriented, to self, place, time  Language:  Speech is fluent, goal directed without any noted dysarthria or aphasia    Cranial nerves:    CNII-XII: Intact on fine exam,   Pupils equal round react to light,   Extraocular muscles are intact  V1 to V3 is intact to light touch,   no facial asymmetry,   Hearing is intact to finger rub and voice  tongue/uvula/palate midline,   shoulder shrug equal,     No pronator drift    Extremities:  Motor:  Upper Extremity    Deltoid Triceps Biceps Wrist  Extension Wrist  Flexion Interosseous   R 5/5 5/5 5/5 5/5 5/5 5/5   L 5/5 5/5 5/5 5/5 5/5 5/5       Thumb   Abduction Thumb  ADDuction Finger  Flexion Finger  Extension     R 5/5 5/5 5/5 5/5     L 5/5 5/5 5/5 5/5        Lower Extremity     Iliopsoas Quadriceps Hamstring Plantarflexion Dorsiflexion EHL   R 5/5 5/5 5/5 5/5 5/5 5/5   L 5/5 5/5 5/5 5/5 5/5 5/5       Reflexes:     DTR: 2+ biceps    2+ triceps   2+ brachioradialis   2+ patellar  2+ Achilles     Vasques's: Negative     Babinski's: Negative     Clonus: Negative     Sensory:      Sensation intact to light touch, temperature sensation, vibration, pinprick     Coordination:      Coordination intact throughout, no dysmetria, normal rapid alternating movements, no dysdiadochokinesia  Cerebellar:  Normal finger-to-nose, normal heel-to-shin  Cervical spine:  No midline cervical tenderness, negative Lhermitte, negative Spurling  Thoracic spine:  No midline thoracic tenderness.   Lumbar spine:  No midline lumbar tenderness.  Surgical incision clean, dry, intact, no tenderness to palpation    Diagnostic Results:  No new imaging to review.    ASSESSMENT/PLAN:      32-year-old man with S1 radiculopathy, status post L5-S1 diskectomy     1. No focal motor deficits on examination in clinic     2. No new imaging to review     3. Patient notes his radicular symptoms have resolved, also notes resolution of the axial back pain.  He continues to have some symptoms of nondermatomal paresthesias in the left leg with extensive physical activity..  Would recommend alternative anti-inflammatory medications, such as Celebrex, given patient is on the higher side for ibuprofen dosage, verses alternating anti-inflammatories for his relief.  Follow-up p.r.n..  Answered all the patient's questions to his satisfaction.      Thank you so very much for allowing me to participate in the care of this patient.  Please feel free to call any questions, comments, or concerns.     Jessica Whitfield MD,MSc  Department of Neurosurgery   Department of Radiology  Department of Neurology  [facility]    [facility]   [employer] [facility] [employer]       Total time spent in counseling and discussion about further management options including relevant lab work, treatment,  prognosis, medications and intended side effects was more than 60 minutes. More than 50 % of the time was spent in counseling and coordination of care.  I spent a total of 60 minutes on the day of the visit.This includes face to face time and non-face to face time preparing to see the patient (eg, review of tests), Obtaining and/or reviewing separately obtained history, Documenting clinical information in the electronic or other health record, Independently interpreting resultsand communicating results to the patient/family/caregiver, or Care coordination     Note dictated with voice recognition software, please excuse any grammatical errors.

## 2023-10-05 ENCOUNTER — TELEPHONE (OUTPATIENT)
Dept: NEUROSURGERY | Facility: CLINIC | Age: 33
End: 2023-10-05
Payer: OTHER MISCELLANEOUS

## 2023-10-05 RX ORDER — CELECOXIB 100 MG/1
100 CAPSULE ORAL 2 TIMES DAILY
Qty: 180 CAPSULE | Refills: 0 | Status: SHIPPED | OUTPATIENT
Start: 2023-10-05 | End: 2024-01-03

## 2024-08-12 ENCOUNTER — PATIENT OUTREACH (OUTPATIENT)
Dept: ADMINISTRATIVE | Facility: OTHER | Age: 34
End: 2024-08-12
Payer: COMMERCIAL

## 2024-08-12 NOTE — PROGRESS NOTES
CHW - Initial Contact    This Community Health Worker completed the Social Determinant of Health questionnaire with patient via telephone today.    Pt identified barriers of most importance are: No barriers reported.    Referrals to community agencies completed with patient/caregiver consent outside of [facility] include: No.  Referrals were put through [facility] - no:   Support and Services: No.  Other information discussed the patient needs / wants help with: SDOH completed. Patient advised he wanted to cancel upcoming visit and will call to r/s when he is avail. Patient did not report any barriers at this time,case will be closed.   Follow up required:No.

## (undated) DEVICE — BUR BONE CUT MICRO TPS 3X3.8MM

## (undated) DEVICE — DURAPREP SURG SCRUB 26ML

## (undated) DEVICE — DRAPE STERI-DRAPE 1000 17X11IN

## (undated) DEVICE — CLIPPER BLADE MOD 4406 (CAREF)

## (undated) DEVICE — TRAY CATH FOL SIL URIMTR 16FR

## (undated) DEVICE — DRAPE ABDOMINAL TIBURON 14X11

## (undated) DEVICE — DRESSING MEPILEX BORDER 4 X 4

## (undated) DEVICE — DRAPE INCISE IOBAN 2 23X17IN

## (undated) DEVICE — SUT D SPECIAL VICRYL 2-0

## (undated) DEVICE — SUT MCRYL PLUS 4-0 PS2 27IN

## (undated) DEVICE — GAUZE SPONGE 4X4 12PLY

## (undated) DEVICE — DRESSING LEUKOPLAST FLEX 1X3IN

## (undated) DEVICE — BLADE ELECTRO EDGE INSULATED

## (undated) DEVICE — DRAPE C-ARM ELAS CLIP 42X120IN

## (undated) DEVICE — SEE MEDLINE ITEM 156905

## (undated) DEVICE — CORD BIPOLAR 12 FOOT

## (undated) DEVICE — SUT VICRYL+ 27 UR-6 VIOL

## (undated) DEVICE — ELECTRODE REM PLYHSV RETURN 9

## (undated) DEVICE — ADHESIVE DERMABOND ADVANCED

## (undated) DEVICE — DIFFUSER

## (undated) DEVICE — DRAPE C-ARMOR EQUIPMENT COVER

## (undated) DEVICE — KIT SPINAL PATIENT CARE JACK

## (undated) DEVICE — MARKER SKIN STND TIP BLUE BARR

## (undated) DEVICE — NDL SPINAL 18GX3.5 SPINOCAN

## (undated) DEVICE — CARTRIDGE OIL

## (undated) DEVICE — DRAPE OPMI STERILE

## (undated) DEVICE — DRESSING SURGICAL 1/2X1/2

## (undated) DEVICE — DRESSING AQUACEL FOAM 5 X 5

## (undated) DEVICE — DRAPE STERI INSTRUMENT 1018

## (undated) DEVICE — DRAPE TOP 53X102IN

## (undated) DEVICE — TUBE FRAZIER 5MM 2FT SOFT TIP